# Patient Record
Sex: MALE | Race: WHITE | Employment: FULL TIME | URBAN - METROPOLITAN AREA
[De-identification: names, ages, dates, MRNs, and addresses within clinical notes are randomized per-mention and may not be internally consistent; named-entity substitution may affect disease eponyms.]

---

## 2017-01-17 RX ORDER — METOPROLOL SUCCINATE 100 MG/1
TABLET, EXTENDED RELEASE ORAL
Qty: 90 TAB | Refills: 3 | Status: SHIPPED | OUTPATIENT
Start: 2017-01-17 | End: 2018-01-29 | Stop reason: SDUPTHER

## 2017-03-31 RX ORDER — AMLODIPINE BESYLATE 5 MG/1
TABLET ORAL
Qty: 90 TAB | Refills: 3 | Status: SHIPPED | OUTPATIENT
Start: 2017-03-31 | End: 2017-05-02 | Stop reason: SDUPTHER

## 2017-05-02 ENCOUNTER — OFFICE VISIT (OUTPATIENT)
Dept: FAMILY MEDICINE CLINIC | Age: 57
End: 2017-05-02

## 2017-05-02 VITALS
WEIGHT: 239 LBS | DIASTOLIC BLOOD PRESSURE: 75 MMHG | HEIGHT: 70 IN | SYSTOLIC BLOOD PRESSURE: 142 MMHG | RESPIRATION RATE: 17 BRPM | HEART RATE: 71 BPM | BODY MASS INDEX: 34.22 KG/M2 | TEMPERATURE: 97.6 F

## 2017-05-02 DIAGNOSIS — I10 ESSENTIAL HYPERTENSION: ICD-10-CM

## 2017-05-02 DIAGNOSIS — R73.03 PREDIABETES: ICD-10-CM

## 2017-05-02 DIAGNOSIS — C61 PROSTATE CANCER (HCC): ICD-10-CM

## 2017-05-02 DIAGNOSIS — Z11.59 NEED FOR HEPATITIS C SCREENING TEST: ICD-10-CM

## 2017-05-02 DIAGNOSIS — K21.9 GASTROESOPHAGEAL REFLUX DISEASE WITHOUT ESOPHAGITIS: ICD-10-CM

## 2017-05-02 DIAGNOSIS — Z51.81 MEDICATION MONITORING ENCOUNTER: ICD-10-CM

## 2017-05-02 DIAGNOSIS — E66.9 OBESITY (BMI 30-39.9): ICD-10-CM

## 2017-05-02 DIAGNOSIS — E55.9 VITAMIN D DEFICIENCY: ICD-10-CM

## 2017-05-02 DIAGNOSIS — Z72.0 TOBACCO ABUSE: ICD-10-CM

## 2017-05-02 DIAGNOSIS — E78.2 MIXED HYPERLIPIDEMIA: ICD-10-CM

## 2017-05-02 DIAGNOSIS — I10 ESSENTIAL HYPERTENSION: Primary | ICD-10-CM

## 2017-05-02 DIAGNOSIS — G47.33 OBSTRUCTIVE SLEEP APNEA SYNDROME: ICD-10-CM

## 2017-05-02 RX ORDER — LISINOPRIL AND HYDROCHLOROTHIAZIDE 12.5; 2 MG/1; MG/1
2 TABLET ORAL DAILY
Qty: 180 TAB | Refills: 3 | Status: SHIPPED | OUTPATIENT
Start: 2017-05-02 | End: 2018-04-25 | Stop reason: SDUPTHER

## 2017-05-02 RX ORDER — AMLODIPINE BESYLATE 10 MG/1
TABLET ORAL
Qty: 90 TAB | Refills: 3 | Status: SHIPPED | OUTPATIENT
Start: 2017-05-02 | End: 2017-05-02 | Stop reason: SDUPTHER

## 2017-05-02 RX ORDER — AMLODIPINE BESYLATE 10 MG/1
TABLET ORAL
Qty: 90 TAB | Refills: 3 | Status: SHIPPED | OUTPATIENT
Start: 2017-05-02 | End: 2017-10-04 | Stop reason: SDUPTHER

## 2017-05-02 NOTE — PATIENT INSTRUCTIONS

## 2017-05-02 NOTE — PROGRESS NOTES
1. Have you been to the ER, urgent care clinic since your last visit? Hospitalized since your last visit? No.     2. Have you seen or consulted any other health care providers outside of the [address] [place] [address] since your last visit? Include any pap smears or colon screening. Yes, saw Dr. Gian Clemente ([facility]) on 3/2017. Patient presents with follow up Hypertension and obesity. He increased his norvasc to 2 tablets 3 months ago.

## 2017-05-02 NOTE — MR AVS SNAPSHOT
Visit Information Date & Time Provider Department Dept. Phone Encounter #  
 5/2/2017  7:45 AM Sneha Contreras, [address] [place] [address] [phone] [id number] Follow-up Instructions Return in about 2 months (around 7/2/2017) for 30 minute slot and fasting labs prior. Upcoming Health Maintenance Date Due Hepatitis C Screening 1960 Pneumococcal 19-64 Medium Risk (1 of 1 - PPSV23) 10/23/1981 DTaP/Tdap/Td series (1 - Tdap) 10/23/1983 FOBT Q 1 YEAR AGE 50-75 10/23/2012 INFLUENZA AGE 9 TO ADULT 8/1/2017 Allergies as of 5/2/2017  Review Complete On: 5/2/2017 By: Sneha Contreras MD  
  
 Severity Noted Reaction Type Reactions Strattera [Atomoxetine]  06/20/2013    Other (comments) Prostatitus Sulfa (Sulfonamide Antibiotics)  06/20/2013    Hives, Itching Venom-wasp  07/29/2014    Swelling Current Immunizations  Reviewed on 6/21/2016 Name Date Influenza Vaccine 10/31/2014 Influenza Vaccine (Quad) PF 12/30/2015 Not reviewed this visit You Were Diagnosed With   
  
 Codes Comments Essential hypertension    -  Primary ICD-10-CM: I10 
ICD-9-CM: 401.9 Medication monitoring encounter     ICD-10-CM: Z51.81 
ICD-9-CM: V58.83 Obstructive sleep apnea syndrome     ICD-10-CM: G47.33 
ICD-9-CM: 327.23 Prediabetes     ICD-10-CM: R73.03 
ICD-9-CM: 790.29 Obesity (BMI 30-39. 9)     ICD-10-CM: E66.9 ICD-9-CM: 278.00 Vitamin D deficiency     ICD-10-CM: E55.9 ICD-9-CM: 268.9 Gastroesophageal reflux disease without esophagitis     ICD-10-CM: K21.9 ICD-9-CM: 530.81 Tobacco abuse     ICD-10-CM: Z72.0 ICD-9-CM: 305.1 Prostate cancer [facility])     ICD-10-CM: T61 ICD-9-CM: 030 Mixed hyperlipidemia     ICD-10-CM: E78.2 ICD-9-CM: 272.2 Vitals BP Pulse Temp Resp Height(growth percentile) Weight(growth percentile) 142/75 71 97.6 °F (36.4 °C) (Oral) 17 5' 10\" (1.778 m) 239 lb (108.4 kg) BMI Smoking Status 34.29 kg/m2 Current Every Day Smoker Vitals History BMI and BSA Data Body Mass Index Body Surface Area  
 34.29 kg/m 2 2.31 m 2 Preferred Pharmacy Pharmacy Name Phone [facility] [facility], [address] [place] [address] [address] [facility] [phone] Your Updated Medication List  
  
   
This list is accurate as of: 5/2/17  8:36 AM.  Always use your most recent med list. amLODIPine 10 mg tablet Commonly known as:  NORVASC  
take 2 tablets by mouth once daily Glucosamine &Chondroit-MV-Min3 081-848-23-0.5 mg Tab Take 1 Tab by mouth daily. lisinopril-hydroCHLOROthiazide 20-12.5 mg per tablet Commonly known as:  Kristen Blair Take 2 Tabs by mouth daily. metoprolol succinate 100 mg tablet Commonly known as:  TOPROL-XL  
take 1 tablet by mouth once daily  
  
 multivitamin tablet Commonly known as:  ONE A DAY Take 1 Tab by mouth daily. omeprazole 20 mg capsule Commonly known as:  PRILOSEC  
take 1 capsule by mouth once daily  
  
 plant stanol jerrell 450 mg Tab Commonly known as:  CHOLEST OFF Take 1 Tab by mouth daily. rosuvastatin 40 mg tablet Commonly known as:  CRESTOR  
TAKE 1 TABLET BY MOUTH NIGHTLY  
  
 VITAMIN D3 1,000 unit Cap Generic drug:  cholecalciferol Take  by mouth. Prescriptions Sent to Pharmacy Refills  
 lisinopril-hydroCHLOROthiazide (PRINZIDE, ZESTORETIC) 20-12.5 mg per tablet 3 Sig: Take 2 Tabs by mouth daily. Class: Normal  
 Pharmacy: [address] [place] [address] Ph #: [phone] Route: Oral  
 amLODIPine (NORVASC) 10 mg tablet 3 Sig: take 2 tablets by mouth once daily Class: Normal  
 Pharmacy: [address] [place] [address] Ph #: [phone] Follow-up Instructions Return in about 2 months (around 7/2/2017) for 30 minute slot and fasting labs prior. To-Do List   
 05/02/2017 Lab:  CBC W/O DIFF   
  
 05/02/2017 Lab:  HEMOGLOBIN A1C WITH EAG   
  
 05/02/2017 Lab:  LIPID PANEL   
  
 05/02/2017 Lab:  METABOLIC PANEL, COMPREHENSIVE   
  
 05/02/2017 Lab:  PSA SCREENING (SCREENING) 05/02/2017 Lab:  VITAMIN B12   
  
 05/02/2017 Lab:  VITAMIN D, 25 HYDROXY Patient Instructions DASH Diet: Care Instructions Your Care Instructions The DASH diet is an eating plan that can help lower your blood pressure. DASH stands for Dietary Approaches to Stop Hypertension. Hypertension is high blood pressure. The DASH diet focuses on eating foods that are high in calcium, potassium, and magnesium. These nutrients can lower blood pressure. The foods that are highest in these nutrients are fruits, vegetables, low-fat dairy products, nuts, seeds, and legumes. But taking calcium, potassium, and magnesium supplements instead of eating foods that are high in those nutrients does not have the same effect. The DASH diet also includes whole grains, fish, and poultry. The DASH diet is one of several lifestyle changes your doctor may recommend to lower your high blood pressure. Your doctor may also want you to decrease the amount of sodium in your diet. Lowering sodium while following the DASH diet can lower blood pressure even further than just the DASH diet alone. Follow-up care is a key part of your treatment and safety. Be sure to make and go to all appointments, and call your doctor if you are having problems. It's also a good idea to know your test results and keep a list of the medicines you take. How can you care for yourself at home? Following the DASH diet · Eat 4 to 5 servings of fruit each day.  A serving is 1 medium-sized piece of fruit, ½ cup chopped or canned fruit, 1/4 cup dried fruit, or 4 ounces (½ cup) of fruit juice. Choose fruit more often than fruit juice. · Eat 4 to 5 servings of vegetables each day. A serving is 1 cup of lettuce or raw leafy vegetables, ½ cup of chopped or cooked vegetables, or 4 ounces (½ cup) of vegetable juice. Choose vegetables more often than vegetable juice. · Get 2 to 3 servings of low-fat and fat-free dairy each day. A serving is 8 ounces of milk, 1 cup of yogurt, or 1 ½ ounces of cheese. · Eat 6 to 8 servings of grains each day. A serving is 1 slice of bread, 1 ounce of dry cereal, or ½ cup of cooked rice, pasta, or cooked cereal. Try to choose whole-grain products as much as possible. · Limit lean meat, poultry, and fish to 2 servings each day. A serving is 3 ounces, about the size of a deck of cards. · Eat 4 to 5 servings of nuts, seeds, and legumes (cooked dried beans, lentils, and split peas) each week. A serving is 1/3 cup of nuts, 2 tablespoons of seeds, or ½ cup of cooked beans or peas. · Limit fats and oils to 2 to 3 servings each day. A serving is 1 teaspoon of vegetable oil or 2 tablespoons of salad dressing. · Limit sweets and added sugars to 5 servings or less a week. A serving is 1 tablespoon jelly or jam, ½ cup sorbet, or 1 cup of lemonade. · Eat less than 2,300 milligrams (mg) of sodium a day. If you limit your sodium to 1,500 mg a day, you can lower your blood pressure even more. Tips for success · Start small. Do not try to make dramatic changes to your diet all at once. You might feel that you are missing out on your favorite foods and then be more likely to not follow the plan. Make small changes, and stick with them. Once those changes become habit, add a few more changes. · Try some of the following: ¨ Make it a goal to eat a fruit or vegetable at every meal and at snacks. This will make it easy to get the recommended amount of fruits and vegetables each day. ¨ Try yogurt topped with fruit and nuts for a snack or healthy dessert. ¨ Add lettuce, tomato, cucumber, and onion to sandwiches. ¨ Combine a ready-made pizza crust with low-fat mozzarella cheese and lots of vegetable toppings. Try using tomatoes, squash, spinach, broccoli, carrots, cauliflower, and onions. ¨ Have a variety of cut-up vegetables with a low-fat dip as an appetizer instead of chips and dip. ¨ Sprinkle sunflower seeds or chopped almonds over salads. Or try adding chopped walnuts or almonds to cooked vegetables. ¨ Try some vegetarian meals using beans and peas. Add garbanzo or kidney beans to salads. Make burritos and tacos with mashed hudson beans or black beans. Where can you learn more? Go to http://cristo-german.info/. Enter G697 in the search box to learn more about \"DASH Diet: Care Instructions. \" Current as of: March 23, 2016 Content Version: 11.2 © 5950-4895 [employer]. Care instructions adapted under license by [employer] (which disclaims liability or warranty for this information). If you have questions about a medical condition or this instruction, always ask your healthcare professional. [place] [address] any warranty or liability for your use of this information. Introducing [facility]! Dear Valeria Clancy: Thank you for requesting a [employer] account. Our records indicate that you already have an active GuideWall account. You can access your account anytime at [email or web address] Did you know that you can access your hospital and ER discharge instructions at any time in GuideWall? You can also review all of your test results from your hospital stay or ER visit. Additional Information If you have questions, please visit the Frequently Asked Questions section of the GuideWall website at [email or web address] Remember, GuideWall is NOT to be used for urgent needs. For medical emergencies, dial 911. Now available from your iPhone and Android! Please provide this summary of care documentation to your next provider. Your primary care clinician is listed as AKSHAT Cabrera. If you have any questions after today's visit, please call [phone].

## 2017-05-02 NOTE — PROGRESS NOTES
Bertram Barrett is a 49 y.o. male and presents with Follow Up Chronic Condition and Hypertension       Subjective:    HTN- taking all meds- watching sodium intake - doubled dose of norvasc  Prediabetes- no polyuria or polydipsia  Tobacco-still smoking. Didn't like wellbutrin. Never tried chantix  Obesity- lost some weight but still has BMI over 30 - reviewed with pt. Stressed about father with metastatic lung cancer. H/o EtOH abuse- still abstinent- approaching 7 years now.      Assessment/Plan:      HTN- mildly elevated- but losing weight so will hold off on adding another med for now. Prediabetes- recheck a1c. Tobacco-strongly encouraged complete cessation. Consider chantix. Obesity- strongly encouraged wt loss- calorie counting/regular weights. H/o EtOH abuse- encouraged continued cessation. RTC in 2 months with labs. Orders Placed This Encounter    METABOLIC PANEL, COMPREHENSIVE     Standing Status:   Future     Standing Expiration Date:   5/3/2018    LIPID PANEL     Standing Status:   Future     Standing Expiration Date:   5/3/2018    PSA SCREENING (SCREENING)     Standing Status:   Future     Standing Expiration Date:   5/3/2018    HEMOGLOBIN A1C WITH EAG     Standing Status:   Future     Standing Expiration Date:   5/3/2018    CBC W/O DIFF     Standing Status:   Future     Standing Expiration Date:   5/3/2018    VITAMIN B12     Standing Status:   Future     Standing Expiration Date:   5/3/2018    VITAMIN D, 25 HYDROXY     Standing Status:   Future     Standing Expiration Date:   5/2/2018    lisinopril-hydroCHLOROthiazide (PRINZIDE, ZESTORETIC) 20-12.5 mg per tablet     Sig: Take 2 Tabs by mouth daily. Dispense:  180 Tab     Refill:  3    amLODIPine (NORVASC) 10 mg tablet     Sig: take 2 tablets by mouth once daily     Dispense:  90 Tab     Refill:  3     Epifanio Zuniga was seen today for follow up chronic condition and hypertension.     Diagnoses and all orders for this visit:    Essential hypertension  -     CBC W/O DIFF; Future    Medication monitoring encounter  -     VITAMIN B12; Future    Obstructive sleep apnea syndrome    Prediabetes    Obesity (BMI 30-39.9)  -     HEMOGLOBIN A1C WITH EAG; Future    Vitamin D deficiency  -     VITAMIN D, 25 HYDROXY; Future    Gastroesophageal reflux disease without esophagitis  -     VITAMIN B12; Future    Tobacco abuse    Prostate cancer (HCC)  -     PSA SCREENING (SCREENING); Future    Mixed hyperlipidemia  -     METABOLIC PANEL, COMPREHENSIVE; Future  -     LIPID PANEL; Future    Other orders  -     lisinopril-hydroCHLOROthiazide (PRINZIDE, ZESTORETIC) 20-12.5 mg per tablet; Take 2 Tabs by mouth daily. -     amLODIPine (NORVASC) 10 mg tablet; take 2 tablets by mouth once daily  -     plant stanol jerrell (CHOLEST OFF) 450 mg tab; Take 1 Tab by mouth daily.  -     Glucosamine &Chondroit-MV-Min3 867-497-26-0.5 mg tab; Take 1 Tab by mouth daily. ROS:  Negative except as mentioned above  Cardiac- no chest pain or palpitations  Pulmonary- no sob or wheezes  GI- no n/v or diarrhea.     SH:  Social History   Substance Use Topics    Smoking status: Current Every Day Smoker     Packs/day: 1.00     Years: 30.00     Types: Cigarettes    Smokeless tobacco: Never Used    Alcohol use No      Comment: recoverin alcoholic         Medications/Allergies:  Current Outpatient Prescriptions on File Prior to Visit   Medication Sig Dispense Refill    amLODIPine (NORVASC) 5 mg tablet take 1 tablet by mouth once daily (Patient taking differently: take 2 tablets by mouth once daily) 90 Tab 3    metoprolol succinate (TOPROL-XL) 100 mg tablet take 1 tablet by mouth once daily 90 Tab 3    omeprazole (PRILOSEC) 20 mg capsule take 1 capsule by mouth once daily 90 Cap 3    lisinopril-hydroCHLOROthiazide (PRINZIDE, ZESTORETIC) 20-25 mg per tablet take 1 tablet by mouth once daily 90 Tab 3    rosuvastatin (CRESTOR) 40 mg tablet TAKE 1 TABLET BY MOUTH NIGHTLY 90 Tab 3    methocarbamol (ROBAXIN) 500 mg tablet Take 1 Tab by mouth four (4) times daily. Take for up to 10 days. 40 Tab 2    Omeprazole delayed release (PRILOSEC D/R) 20 mg tablet Take 1 tablet by mouth daily. 90 tablet 3    albuterol (PROVENTIL VENTOLIN) 2.5 mg /3 mL (0.083 %) nebulizer solution by Nebulization route once.  PLANT STANOL BURAK (CHOLEST OFF PO) Take  by mouth.  Glucosamine &Chondroit-MV-Min3 114-801-67-0.5 mg tab Take  by mouth.  Cholecalciferol, Vitamin D3, (VITAMIN D3) 1,000 unit cap Take  by mouth.  multivitamin (ONE A DAY) tablet Take 1 Tab by mouth daily. No current facility-administered medications on file prior to visit. Allergies   Allergen Reactions    Strattera [Atomoxetine] Other (comments)     Prostatitus    Sulfa (Sulfonamide Antibiotics) Hives and Itching    Venom-Wasp Swelling       Objective:  Visit Vitals    /75    Pulse 71    Temp 97.6 °F (36.4 °C) (Oral)    Resp 17    Ht 5' 10\" (1.778 m)    Wt 239 lb (108.4 kg)    BMI 34.29 kg/m2    Body mass index is 34.29 kg/(m^2). Constitutional: Well developed, nourished, no distress, alert   HENT: Exterior ears and tympanic membranes normal bilaterally. Supple neck. No thyromegaly or lymphadenopathy. Oropharynx clear and moist mucous membranes. Eyes: Conjunctiva normal. PERRL. CV: S1, S2.  RRR. No murmurs/rubs. No thrills palpated. No carotid bruits. Intact distal pulses. No edema. Pulm: No abnormalities on inspection. Clear to auscultation bilaterally. No wheezing/rhonchi. Normal effort. GI: Soft, nontender, nondistended. Normal active bowel sounds. No  masses on palpation. No hepatosplenomegaly.

## 2017-05-22 RX ORDER — ROSUVASTATIN CALCIUM 40 MG/1
TABLET, COATED ORAL
Qty: 90 TAB | Refills: 3 | Status: SHIPPED | OUTPATIENT
Start: 2017-05-22 | End: 2018-06-07 | Stop reason: SDUPTHER

## 2017-07-03 ENCOUNTER — OFFICE VISIT (OUTPATIENT)
Dept: FAMILY MEDICINE CLINIC | Age: 57
End: 2017-07-03

## 2017-07-03 VITALS
HEART RATE: 66 BPM | RESPIRATION RATE: 17 BRPM | WEIGHT: 237.4 LBS | TEMPERATURE: 97.8 F | HEIGHT: 70 IN | DIASTOLIC BLOOD PRESSURE: 66 MMHG | BODY MASS INDEX: 33.99 KG/M2 | SYSTOLIC BLOOD PRESSURE: 123 MMHG

## 2017-07-03 DIAGNOSIS — E66.09 NON MORBID OBESITY DUE TO EXCESS CALORIES: ICD-10-CM

## 2017-07-03 DIAGNOSIS — F17.210 CIGARETTE NICOTINE DEPENDENCE WITHOUT COMPLICATION: Primary | ICD-10-CM

## 2017-07-03 RX ORDER — VARENICLINE TARTRATE 1 MG/1
TABLET, FILM COATED ORAL
Qty: 60 TAB | Refills: 4 | Status: SHIPPED | OUTPATIENT
Start: 2017-07-03 | End: 2017-10-01

## 2017-07-03 RX ORDER — VARENICLINE TARTRATE 25 MG
KIT ORAL
Qty: 1 DOSE PACK | Refills: 0 | Status: SHIPPED | OUTPATIENT
Start: 2017-07-03 | End: 2017-08-04 | Stop reason: ALTCHOICE

## 2017-07-03 NOTE — PROGRESS NOTES
1. Have you been to the ER, urgent care clinic since your last visit? Hospitalized since your last visit? No.     2. Have you seen or consulted any other health care providers outside of the [address] [place] [address] since your last visit? Include any pap smears or colon screening. No.     Patient presents with follow up Hypertension, Pre diabetes, Obesity and tobacco abuse.

## 2017-07-03 NOTE — MR AVS SNAPSHOT
Visit Information Date & Time Provider Department Dept. Phone Encounter #  
 7/3/2017  9:15 AM Jessa Manning, [address] [facility] [phone] [id number] Follow-up Instructions Return in about 4 weeks (around 7/31/2017) for with fasting labs prior. Jos Lehman Upcoming Health Maintenance Date Due Hepatitis C Screening 1960 Pneumococcal 19-64 Highest Risk (1 of 3 - PCV13) 10/23/1981 DTaP/Tdap/Td series (1 - Tdap) 10/23/1983 FOBT Q 1 YEAR AGE 50-75 10/23/2012 INFLUENZA AGE 9 TO ADULT 8/1/2017 Allergies as of 7/3/2017  Review Complete On: 7/3/2017 By: Jessa Manning MD  
  
 Severity Noted Reaction Type Reactions Strattera [Atomoxetine]  06/20/2013    Other (comments) Prostatitus Sulfa (Sulfonamide Antibiotics)  06/20/2013    Hives, Itching Venom-wasp  07/29/2014    Swelling Current Immunizations  Reviewed on 6/21/2016 Name Date Influenza Vaccine 10/31/2014 Influenza Vaccine (Quad) PF 12/30/2015 Not reviewed this visit You Were Diagnosed With   
  
 Codes Comments Cigarette nicotine dependence without complication    -  Primary ICD-10-CM: F09.216 ICD-9-CM: 305.1 Non morbid obesity due to excess calories     ICD-10-CM: E66.09 
ICD-9-CM: 278.00 Vitals BP Pulse Temp Resp Height(growth percentile) Weight(growth percentile) 123/66 66 97.8 °F (36.6 °C) (Oral) 17 5' 10\" (1.778 m) 237 lb 6.4 oz (107.7 kg) BMI Smoking Status 34.06 kg/m2 Current Every Day Smoker Vitals History BMI and BSA Data Body Mass Index Body Surface Area 34.06 kg/m 2 2.31 m 2 Preferred Pharmacy Pharmacy Name Phone [facility], [address] [place] [address] [address] [facility] [phone] Your Updated Medication List  
  
   
This list is accurate as of: 7/3/17  9:34 AM.  Always use your most recent med list. amLODIPine 10 mg tablet Commonly known as:  NORVASC  
take 1 tablets by mouth once daily Glucosamine &Chondroit-MV-Min3 370-383-50-0.5 mg Tab Take 1 Tab by mouth daily. lisinopril-hydroCHLOROthiazide 20-12.5 mg per tablet Commonly known as:  Mayda Buttner Take 2 Tabs by mouth daily. metoprolol succinate 100 mg tablet Commonly known as:  TOPROL-XL  
take 1 tablet by mouth once daily  
  
 multivitamin tablet Commonly known as:  ONE A DAY Take 1 Tab by mouth daily. OMEGA 3-6-9 40-60-10 mg-mg-unit Cap Generic drug:  OM 3-E-Linol-ALA-Oleic-GLA-Lip Take  by mouth daily. omeprazole 20 mg capsule Commonly known as:  PRILOSEC  
take 1 capsule by mouth once daily  
  
 plant stanol jerrell 450 mg Tab Commonly known as:  CHOLEST OFF Take 1 Tab by mouth daily. PROBIOTIC 4X 10-15 mg Tbec Generic drug:  B.infantis-B.ani-B.long-B.bifi Take  by mouth daily. rosuvastatin 40 mg tablet Commonly known as:  CRESTOR  
take 1 tablet by mouth at bedtime * varenicline 0.5 mg (11)- 1 mg (42) Dspk Commonly known as:  CHANTIX STARTER TAHIR Follow starter pack directions * varenicline 1 mg tablet Commonly known as:  Rachell Sleeper One tab PO BID  
  
 VITAMIN D3 1,000 unit Cap Generic drug:  cholecalciferol Take  by mouth. * Notice: This list has 2 medication(s) that are the same as other medications prescribed for you. Read the directions carefully, and ask your doctor or other care provider to review them with you. Prescriptions Printed Refills  
 varenicline (CHANTIX) 1 mg tablet 4 Sig: One tab PO BID Class: Print Prescriptions Sent to Pharmacy Refills  
 varenicline (CHANTIX STARTER TAHIR) 0.5 mg (11)- 1 mg (42) DsPk 0 Sig: Follow starter pack directions Class: Normal  
 Pharmacy: [id number], [address] [place] [address] #: [phone] Follow-up Instructions Return in about 4 weeks (around 7/31/2017) for with fasting labs prior. Kevin Hurtado Patient Instructions Varenicline (By mouth) Varenicline ([place] Fails) Helps you quit smoking, as part of a support program.  
Brand Name(s): Chantix, Chantix Starting Month Pak There may be other brand names for this medicine. When This Medicine Should Not Be Used: This medicine is not right for everyone. Do not use it if you had an allergic reaction to varenicline. How to Use This Medicine:  
Tablet · Take your medicine as directed. Your dose or schedule may need to be changed to find what works best for you. · Tell your doctor what date you have set to stop smoking. This medicine needs to be started 1 week before that date. · It is best to take this medicine with a full glass of water after you eat. · This medicine should come with a Medication Guide. Ask your pharmacist for a copy if you do not have one. · Missed dose: Take a dose as soon as you remember. If it is almost time for your next dose, wait until then and take a regular dose. Do not take extra medicine to make up for a missed dose. · Store the medicine in a closed container at room temperature, away from heat, moisture, and direct light. Drugs and Foods to Avoid: Ask your doctor or pharmacist before using any other medicine, including over-the-counter medicines, vitamins, and herbal products. · Some medicines can affect how varenicline works. Tell your doctor if you are using any of the following: 
¨ Insulin ¨ Theophylline ¨ Blood thinner (including warfarin) · This medicine can affect your ability to tolerate alcohol. Limit the amount of alcohol that you drink until you know how this medicine affects you. Warnings While Using This Medicine: · Tell your doctor if you are pregnant or breastfeeding, or if you have kidney disease, heart or blood vessel problems, angina, or a history of heart attack, stroke, depression or mental health problems, or seizures. · For some people, this medicine may increase mental or emotional problems. This may lead to thoughts of suicide and violence. Talk with your doctor right away if you have any thought or behavior changes that concern you. Tell your doctor if you or anyone in your family has a history of bipolar disorder or suicide attempts. · This medicine may cause the following problems: 
¨ Increased risk of heart attack or stroke ¨ Serious skin reactions ¨ Sleepwalking · This medicine may cause you to become dizzy or drowsy, or have trouble concentrating. Do not drive or do anything else that could be dangerous until you know this medicine affects you. · Your doctor will check your progress and the effects of this medicine at regular visits. Keep all appointments. · Keep all medicine out of the reach of children. Never share your medicine with anyone. Possible Side Effects While Using This Medicine:  
Call your doctor right away if you notice any of these side effects: · Allergic reaction: Itching or hives, swelling in your face or hands, swelling or tingling in your mouth or throat, chest tightness, trouble breathing · Blistering, peeling, red skin rash · Chest pain, fast, pounding, or uneven heartbeat · Feeling anxious, depressed, restless, or irritable · Numbness or weakness on one side of your body, sudden or severe headache, problems with vision, speech, or walking · Seeing or hearing things that are not really there · Seizures · Thoughts of hurting yourself or others, unusual moods or behaviors If you notice these less serious side effects, talk with your doctor:  
· Headache · Nausea, gas · Trouble sleeping, unusual dreams, sleepwalking If you notice other side effects that you think are caused by this medicine, tell your doctor. Call your doctor for medical advice about side effects.  You may report side effects to FDA at 3-261-FDA-3579 © 2017 [facility] Information is for End User's use only and may not be sold, redistributed or otherwise used for commercial purposes. The above information is an  only. It is not intended as medical advice for individual conditions or treatments. Talk to your doctor, nurse or pharmacist before following any medical regimen to see if it is safe and effective for you. Introducing [facility]! Dear Virgen Adams: Thank you for requesting a Crossbar account. Our records indicate that you already have an active Crossbar account. You can access your account anytime at [email or web address] Did you know that you can access your hospital and ER discharge instructions at any time in Crossbar? You can also review all of your test results from your hospital stay or ER visit. Additional Information If you have questions, please visit the Frequently Asked Questions section of the Crossbar website at [email or web address] Remember, Crossbar is NOT to be used for urgent needs. For medical emergencies, dial 911. Now available from your iPhone and Android! Please provide this summary of care documentation to your next provider. Your primary care clinician is listed as AKSHAT Cabrera. If you have any questions after today's visit, please call [phone].

## 2017-07-03 NOTE — PATIENT INSTRUCTIONS
Varenicline (By mouth)   Varenicline ([id number]-kleen)  Helps you quit smoking, as part of a support program.   Brand Name(s): Chantix, Chantix Starting Month Pak   There may be other brand names for this medicine. When This Medicine Should Not Be Used: This medicine is not right for everyone. Do not use it if you had an allergic reaction to varenicline. How to Use This Medicine:   Tablet  · Take your medicine as directed. Your dose or schedule may need to be changed to find what works best for you. · Tell your doctor what date you have set to stop smoking. This medicine needs to be started 1 week before that date. · It is best to take this medicine with a full glass of water after you eat. · This medicine should come with a Medication Guide. Ask your pharmacist for a copy if you do not have one. · Missed dose: Take a dose as soon as you remember. If it is almost time for your next dose, wait until then and take a regular dose. Do not take extra medicine to make up for a missed dose. · Store the medicine in a closed container at room temperature, away from heat, moisture, and direct light. Drugs and Foods to Avoid:   Ask your doctor or pharmacist before using any other medicine, including over-the-counter medicines, vitamins, and herbal products. · Some medicines can affect how varenicline works. Tell your doctor if you are using any of the following:  ¨ Insulin  ¨ Theophylline  ¨ Blood thinner (including warfarin)  · This medicine can affect your ability to tolerate alcohol. Limit the amount of alcohol that you drink until you know how this medicine affects you. Warnings While Using This Medicine:   · Tell your doctor if you are pregnant or breastfeeding, or if you have kidney disease, heart or blood vessel problems, angina, or a history of heart attack, stroke, depression or mental health problems, or seizures. · For some people, this medicine may increase mental or emotional problems.  This may lead to thoughts of suicide and violence. Talk with your doctor right away if you have any thought or behavior changes that concern you. Tell your doctor if you or anyone in your family has a history of bipolar disorder or suicide attempts. · This medicine may cause the following problems:  ¨ Increased risk of heart attack or stroke  ¨ Serious skin reactions  ¨ Sleepwalking  · This medicine may cause you to become dizzy or drowsy, or have trouble concentrating. Do not drive or do anything else that could be dangerous until you know this medicine affects you. · Your doctor will check your progress and the effects of this medicine at regular visits. Keep all appointments. · Keep all medicine out of the reach of children. Never share your medicine with anyone. Possible Side Effects While Using This Medicine:   Call your doctor right away if you notice any of these side effects:  · Allergic reaction: Itching or hives, swelling in your face or hands, swelling or tingling in your mouth or throat, chest tightness, trouble breathing  · Blistering, peeling, red skin rash  · Chest pain, fast, pounding, or uneven heartbeat  · Feeling anxious, depressed, restless, or irritable  · Numbness or weakness on one side of your body, sudden or severe headache, problems with vision, speech, or walking  · Seeing or hearing things that are not really there  · Seizures  · Thoughts of hurting yourself or others, unusual moods or behaviors  If you notice these less serious side effects, talk with your doctor:   · Headache  · Nausea, gas  · Trouble sleeping, unusual dreams, sleepwalking  If you notice other side effects that you think are caused by this medicine, tell your doctor. Call your doctor for medical advice about side effects. You may report side effects to FDA at 1-557-FDA-0762  © 2017 [address] Information is for End User's use only and may not be sold, redistributed or otherwise used for commercial purposes.   The above information is an  only. It is not intended as medical advice for individual conditions or treatments. Talk to your doctor, nurse or pharmacist before following any medical regimen to see if it is safe and effective for you.

## 2017-07-03 NOTE — PROGRESS NOTES
Lakhwinder Willett is a 49 y.o. male and presents with Follow Up Chronic Condition; Hypertension; Nicotine Dependence; Obesity (Pre diabetes); and Other       Subjective:  Cigarette smoking- ready to quit- interested in chantix. Assessment/Plan:    Nola Pascual was seen today for follow up chronic condition, hypertension, nicotine dependence, obesity and other. Diagnoses and all orders for this visit:    Cigarette nicotine dependence without complication- starting chantix and discussed risked benefits possible side effects and all questions answered. Non morbid obesity due to excess calories- encouraged continued wt loss and exercise. Goal of 1 lb per week loss. Other orders  -     varenicline (CHANTIX STARTER TAHIR) 0.5 mg (11)- 1 mg (42) DsPk; Follow starter pack directions  -     varenicline (CHANTIX) 1 mg tablet; One tab PO BID          RTC in 1 month  Orders Placed This Encounter    OM 3-E-Linol-ALA-Oleic-GLA-Lip (OMEGA 3-6-9) 40-60-10 mg-mg-unit cap     Sig: Take  by mouth daily.  B.infantis-B.ani-B.long-B.bifi (PROBIOTIC 4X) 10-15 mg TbEC     Sig: Take  by mouth daily.  varenicline (CHANTIX STARTER TAHIR) 0.5 mg (11)- 1 mg (42) DsPk     Sig: Follow starter pack directions     Dispense:  1 Dose Pack     Refill:  0    varenicline (CHANTIX) 1 mg tablet     Sig: One tab PO BID     Dispense:  60 Tab     Refill:  4   Nola Pascual was seen today for follow up chronic condition, hypertension, nicotine dependence, obesity and other. Diagnoses and all orders for this visit:    Cigarette nicotine dependence without complication    Non morbid obesity due to excess calories    Other orders  -     varenicline (CHANTIX STARTER TAHIR) 0.5 mg (11)- 1 mg (42) DsPk; Follow starter pack directions  -     varenicline (CHANTIX) 1 mg tablet; One tab PO BID              ROS:  Negative except as mentioned above  Cardiac- no chest pain or palpitations  Pulmonary- no sob or wheezes  GI- no n/v or diarrhea.       SH:  Social History Substance Use Topics    Smoking status: Current Every Day Smoker     Packs/day: 1.00     Years: 30.00     Types: Cigarettes    Smokeless tobacco: Never Used    Alcohol use No      Comment: recoverin alcoholic         Medications/Allergies:  Current Outpatient Prescriptions on File Prior to Visit   Medication Sig Dispense Refill    rosuvastatin (CRESTOR) 40 mg tablet take 1 tablet by mouth at bedtime 90 Tab 3    lisinopril-hydroCHLOROthiazide (PRINZIDE, ZESTORETIC) 20-12.5 mg per tablet Take 2 Tabs by mouth daily. 180 Tab 3    plant stanol jerrell (CHOLEST OFF) 450 mg tab Take 1 Tab by mouth daily. 1 Tab 0    Glucosamine &Chondroit-MV-Min3 523-178-09-0.5 mg tab Take 1 Tab by mouth daily. 1 Tab 0    amLODIPine (NORVASC) 10 mg tablet take 1 tablets by mouth once daily 90 Tab 3    metoprolol succinate (TOPROL-XL) 100 mg tablet take 1 tablet by mouth once daily 90 Tab 3    omeprazole (PRILOSEC) 20 mg capsule take 1 capsule by mouth once daily 90 Cap 3    Cholecalciferol, Vitamin D3, (VITAMIN D3) 1,000 unit cap Take  by mouth.  multivitamin (ONE A DAY) tablet Take 1 Tab by mouth daily. No current facility-administered medications on file prior to visit. Allergies   Allergen Reactions    Strattera [Atomoxetine] Other (comments)     Prostatitus    Sulfa (Sulfonamide Antibiotics) Hives and Itching    Venom-Wasp Swelling       Objective:  Visit Vitals    /66    Pulse 66    Temp 97.8 °F (36.6 °C) (Oral)    Resp 17    Ht 5' 10\" (1.778 m)    Wt 237 lb 6.4 oz (107.7 kg)    BMI 34.06 kg/m2    Body mass index is 34.06 kg/(m^2). Constitutional: Well developed, nourished, no distress, alert   CV: S1, S2.  RRR. No murmurs/rubs. No thrills palpated. No carotid bruits. Intact distal pulses. No edema. Pulm: No abnormalities on inspection. Clear to auscultation bilaterally. No wheezing/rhonchi. Normal effort. GI: Soft, nontender, nondistended. Normal active bowel sounds.  No masses on palpation. No hepatosplenomegaly.

## 2017-07-28 ENCOUNTER — TELEPHONE (OUTPATIENT)
Dept: FAMILY MEDICINE CLINIC | Age: 57
End: 2017-07-28

## 2017-07-28 NOTE — TELEPHONE ENCOUNTER
Up at  when patient called. Tammy Whitt PSR answered phone. Could hear patient yelling through phone at Ms. Annalisa Rodriguez about his appointment being rescheduled. PSR Arthur tried several times to explain why appointment was rescheduled However patient would not let her speak. Per Jaylyn Lomeli patient was called on 7-27-17 to inform patient his appointment needed to be rescheduled. Per PSAKSHAT Gibson patient did not answer phone so a message was left informing him of rescheduled appointment  new date and time and if this was not a good date or time to call back and the office would schedule an appointment to meet his needs. Patient continued yelling and not letting  NAHUN Gibson get a full sentence out. I asked her to place patient on hold. At that time I resumed call. Identified self and patient started yelling again. I attempted to explain why appointment was rescheduled. Patient demanded to speak to . Informed patient that she was out of office until Monday 7-31-17 and I would leave her a message to return his call. Patient still not satisfied and still yelling. Apologized to patient. Asked if there was any other way I could help him. Patient stated, \"you haven't helped me with anything\". Wished patient a good day and ended call.

## 2017-07-28 NOTE — TELEPHONE ENCOUNTER
Pt. Called and became very angry when appt. was changed due to physician having meeting. Pt. Checked message late last night but message was left yesterday at 11:24am. [facility] the head nurse talked to him and tried to explain to him that the message was left so that he would not make an unnecessary trip to the office.

## 2017-08-04 ENCOUNTER — OFFICE VISIT (OUTPATIENT)
Dept: FAMILY MEDICINE CLINIC | Age: 57
End: 2017-08-04

## 2017-08-04 VITALS
SYSTOLIC BLOOD PRESSURE: 144 MMHG | TEMPERATURE: 96.9 F | HEART RATE: 67 BPM | DIASTOLIC BLOOD PRESSURE: 68 MMHG | RESPIRATION RATE: 16 BRPM | HEIGHT: 70 IN | WEIGHT: 243.4 LBS | BODY MASS INDEX: 34.84 KG/M2

## 2017-08-04 DIAGNOSIS — E87.0 HYPERNATREMIA: ICD-10-CM

## 2017-08-04 DIAGNOSIS — Z72.0 TOBACCO ABUSE: ICD-10-CM

## 2017-08-04 DIAGNOSIS — E87.0 HYPERNATREMIA: Primary | ICD-10-CM

## 2017-08-04 RX ORDER — ALBUTEROL SULFATE 90 UG/1
1 AEROSOL, METERED RESPIRATORY (INHALATION)
Qty: 1 INHALER | Refills: 1 | Status: SHIPPED | OUTPATIENT
Start: 2017-08-04 | End: 2020-08-17 | Stop reason: SDUPTHER

## 2017-08-04 NOTE — PATIENT INSTRUCTIONS

## 2017-08-04 NOTE — PROGRESS NOTES
1. Have you been to the ER, urgent care clinic since your last visit? Hospitalized since your last visit? No.     2. Have you seen or consulted any other health care providers outside of the [address] [place] [address] since your last visit? Include any pap smears or colon screening. No.    Patient presents with follow up nicotine dependence.

## 2017-08-04 NOTE — PROGRESS NOTES
Jacquelin Mansfield is a 49 y.o. male and presents with Follow Up Chronic Condition and Nicotine Dependence (On chantix)       Subjective: Tobacco- stopped on 7/24/17- using patch also at 14mg. Having some outbursts but states work colleagues have commented on how calm he is. Feels some tightness of breathing- and has used albuterol in the past with reactive airway. Assessment/Plan:      Tobacco- encouraged chantix for at least 3 months. Discussed tapering of patch (this should be less effective given chantix)   Labs reviewed. Na sl elevated but was normal previously - recheck in 1-3 months. Refilled albuterol - if not improving pt will return. RTC in 2 nonths with labs. Orders Placed This Encounter    METABOLIC PANEL, BASIC     Standing Status:   Future     Standing Expiration Date:   8/5/2018    albuterol (PROVENTIL HFA, VENTOLIN HFA, PROAIR HFA) 90 mcg/actuation inhaler     Sig: Take 1 Puff by inhalation every six (6) hours as needed for Wheezing. Dispense:  1 Inhaler     Refill:  1           ROS:  Negative except as mentioned above  Cardiac-  Pulmonary-  GI-    SH:  Social History   Substance Use Topics    Smoking status: Current Every Day Smoker     Packs/day: 1.00     Years: 30.00     Types: Cigarettes    Smokeless tobacco: Never Used    Alcohol use No      Comment: recoverin alcoholic         Medications/Allergies:  Current Outpatient Prescriptions on File Prior to Visit   Medication Sig Dispense Refill    OM 3-E-Linol-ALA-Oleic-GLA-Lip (OMEGA 3-6-9) 40-60-10 mg-mg-unit cap Take  by mouth daily.  B.infantis-B.ani-B.long-B.bifi (PROBIOTIC 4X) 10-15 mg TbEC Take  by mouth daily.       varenicline (CHANTIX STARTER TAHIR) 0.5 mg (11)- 1 mg (42) DsPk Follow starter pack directions 1 Dose Pack 0    varenicline (CHANTIX) 1 mg tablet One tab PO BID 60 Tab 4    rosuvastatin (CRESTOR) 40 mg tablet take 1 tablet by mouth at bedtime 90 Tab 3    lisinopril-hydroCHLOROthiazide (PRINZIDE, ZESTORETIC) 20-12.5 mg per tablet Take 2 Tabs by mouth daily. 180 Tab 3    plant stanol jerrell (CHOLEST OFF) 450 mg tab Take 1 Tab by mouth daily. 1 Tab 0    Glucosamine &Chondroit-MV-Min3 970-412-81-0.5 mg tab Take 1 Tab by mouth daily. 1 Tab 0    amLODIPine (NORVASC) 10 mg tablet take 1 tablets by mouth once daily 90 Tab 3    metoprolol succinate (TOPROL-XL) 100 mg tablet take 1 tablet by mouth once daily 90 Tab 3    omeprazole (PRILOSEC) 20 mg capsule take 1 capsule by mouth once daily 90 Cap 3    Cholecalciferol, Vitamin D3, (VITAMIN D3) 1,000 unit cap Take  by mouth.  multivitamin (ONE A DAY) tablet Take 1 Tab by mouth daily. No current facility-administered medications on file prior to visit. Allergies   Allergen Reactions    Strattera [Atomoxetine] Other (comments)     Prostatitus    Sulfa (Sulfonamide Antibiotics) Hives and Itching    Venom-Wasp Swelling       Objective:  Visit Vitals    /68    Pulse 67    Temp 96.9 °F (36.1 °C) (Oral)    Resp 16    Ht 5' 10\" (1.778 m)    Wt 243 lb 6.4 oz (110.4 kg)    BMI 34.92 kg/m2    Body mass index is 34.92 kg/(m^2). Constitutional: Well developed, nourished, no distress, alert   HENT: Exterior ears and tympanic membranes normal bilaterally. Supple neck. No thyromegaly or lymphadenopathy. Oropharynx clear and moist mucous membranes. Eyes: Conjunctiva normal. PERRL. CV: S1, S2.  RRR. No murmurs/rubs. No thrills palpated. No carotid bruits. Intact distal pulses. No edema. Pulm: No abnormalities on inspection. Clear to auscultation bilaterally. No wheezing/rhonchi. Normal effort. GI: Soft, nontender, nondistended. Normal active bowel sounds. No  masses on palpation. No hepatosplenomegaly.

## 2017-08-04 NOTE — MR AVS SNAPSHOT
Visit Information Date & Time Provider Department Dept. Phone Encounter #  
 8/4/2017  7:45 AM Rosy Fernandez, [facility] [phone] [id number] Follow-up Instructions Return in about 2 months (around 10/4/2017) for 30 minute slot. Upcoming Health Maintenance Date Due Hepatitis C Screening 1960 Pneumococcal 19-64 Highest Risk (1 of 3 - PCV13) 10/23/1981 DTaP/Tdap/Td series (1 - Tdap) 10/23/1983 FOBT Q 1 YEAR AGE 50-75 10/23/2012 INFLUENZA AGE 9 TO ADULT 8/1/2017 Allergies as of 8/4/2017  Review Complete On: 8/4/2017 By: Rosy Fernandez MD  
  
 Severity Noted Reaction Type Reactions Strattera [Atomoxetine]  06/20/2013    Other (comments) Prostatitus Sulfa (Sulfonamide Antibiotics)  06/20/2013    Hives, Itching Venom-wasp  07/29/2014    Swelling Current Immunizations  Reviewed on 6/21/2016 Name Date Influenza Vaccine 10/31/2014 Influenza Vaccine (Quad) PF 12/30/2015 Not reviewed this visit You Were Diagnosed With   
  
 Codes Comments Hypernatremia    -  Primary ICD-10-CM: E87.0 ICD-9-CM: 276.0 Tobacco abuse     ICD-10-CM: Z72.0 ICD-9-CM: 305.1 Vitals BP Pulse Temp Resp Height(growth percentile) Weight(growth percentile) 144/68 67 96.9 °F (36.1 °C) (Oral) 16 5' 10\" (1.778 m) 243 lb 6.4 oz (110.4 kg) BMI Smoking Status 34.92 kg/m2 Current Every Day Smoker BMI and BSA Data Body Mass Index Body Surface Area 34.92 kg/m 2 2.34 m 2 Preferred Pharmacy Pharmacy Name Phone [address] [address] [address] [facility] [phone] Your Updated Medication List  
  
   
This list is accurate as of: 8/4/17  8:22 AM.  Always use your most recent med list.  
  
  
  
  
 albuterol 90 mcg/actuation inhaler Commonly known as:  PROVENTIL HFA, VENTOLIN HFA, PROAIR HFA  
 Take 1 Puff by inhalation every six (6) hours as needed for Wheezing. amLODIPine 10 mg tablet Commonly known as:  NORVASC  
take 1 tablets by mouth once daily Glucosamine &Chondroit-MV-Min3 494-757-13-0.5 mg Tab Take 1 Tab by mouth daily. lisinopril-hydroCHLOROthiazide 20-12.5 mg per tablet Commonly known as:  Marlaine Nelli Take 2 Tabs by mouth daily. metoprolol succinate 100 mg tablet Commonly known as:  TOPROL-XL  
take 1 tablet by mouth once daily  
  
 multivitamin tablet Commonly known as:  ONE A DAY Take 1 Tab by mouth daily. OMEGA 3-6-9 40-60-10 mg-mg-unit Cap Generic drug:  OM 3-E-Linol-ALA-Oleic-GLA-Lip Take  by mouth daily. omeprazole 20 mg capsule Commonly known as:  PRILOSEC  
take 1 capsule by mouth once daily  
  
 plant stanol jerrell 450 mg Tab Commonly known as:  CHOLEST OFF Take 1 Tab by mouth daily. PROBIOTIC 4X 10-15 mg Tbec Generic drug:  B.infantis-B.ani-B.long-B.bifi Take  by mouth daily. rosuvastatin 40 mg tablet Commonly known as:  CRESTOR  
take 1 tablet by mouth at bedtime  
  
 varenicline 1 mg tablet Commonly known as:  Fritzi Keepers One tab PO BID  
  
 VITAMIN D3 1,000 unit Cap Generic drug:  cholecalciferol Take  by mouth. Prescriptions Sent to Pharmacy Refills  
 albuterol (PROVENTIL HFA, VENTOLIN HFA, PROAIR HFA) 90 mcg/actuation inhaler 1 Sig: Take 1 Puff by inhalation every six (6) hours as needed for Wheezing. Class: Normal  
 Pharmacy: [address] [place] [address], [address] [place] [address] #: [phone] Route: Inhalation Follow-up Instructions Return in about 2 months (around 10/4/2017) for 30 minute slot. To-Do List   
 08/04/2017 Lab:  METABOLIC PANEL, BASIC Patient Instructions Learning About Benefits From Quitting Smoking How does quitting smoking make you healthier? If you're thinking about quitting smoking, you may have a few reasons to be smoke-free. Your health may be one of them. · When you quit smoking, you lower your risks for cancer, lung disease, heart attack, stroke, blood vessel disease, and blindness from macular degeneration. · When you're smoke-free, you get sick less often, and you heal faster. You are less likely to get colds, flu, bronchitis, and pneumonia. · As a nonsmoker, you may find that your mood is better and you are less stressed. When and how will you feel healthier? Quitting has real health benefits that start from day 1 of being smoke-free. And the longer you stay smoke-free, the healthier you get and the better you feel. The first hours · After just 20 minutes, your blood pressure and heart rate go down. That means there's less stress on your heart and blood vessels. · Within 12 hours, the level of carbon monoxide in your blood drops back to normal. That makes room for more oxygen. With more oxygen in your body, you may notice that you have more energy than when you smoked. After 2 weeks · Your lungs start to work better. · Your risk of heart attack starts to drop. After 1 month · When your lungs are clear, you cough less and breathe deeper, so it's easier to be active. · Your sense of taste and smell return. That means you can enjoy food more than you have since you started smoking. Over the years · After 1 year, your risk of heart disease is half what it would be if you kept smoking. · After 5 years, your risk of stroke starts to shrink. Within a few years after that, it's about the same as if you'd never smoked. · After 10 years, your risk of dying from lung cancer is cut by about half. And your risk for many other types of cancer is lower too. How would quitting help others in your life? When you quit smoking, you improve the health of everyone who now breathes in your smoke. · Their heart, lung, and cancer risks drop, much like yours. · They are sick less. For babies and small children, living smoke-free means they're less likely to have ear infections, pneumonia, and bronchitis. · If you're a woman who is or will be pregnant someday, quitting smoking means a healthier . · Children who are close to you are less likely to become adult smokers. Where can you learn more? Go to http://cristo-german.info/. Enter [id number] [id number] 11 in the search box to learn more about \"Learning About Benefits From Quitting Smoking. \" Current as of: 2017 Content Version: 11.3 © 5056-4838 [employer]. Care instructions adapted under license by [employer] (which disclaims liability or warranty for this information). If you have questions about a medical condition or this instruction, always ask your healthcare professional. [place] [address] any warranty or liability for your use of this information. Introducing [facility]! Dear Maricruz Zuniga: Thank you for requesting a [employer] account. Our records indicate that you already have an active [employer] account. You can access your account anytime at [email or web address] Did you know that you can access your hospital and ER discharge instructions at any time in [employer]? You can also review all of your test results from your hospital stay or ER visit. Additional Information If you have questions, please visit the Frequently Asked Questions section of the [employer] website at [email or web address] Remember, [employer] is NOT to be used for urgent needs. For medical emergencies, dial 911. Now available from your iPhone and Android! Please provide this summary of care documentation to your next provider. Your primary care clinician is listed as AKSHAT Cabrera.  If you have any questions after today's visit, please call [phone].

## 2017-10-04 ENCOUNTER — OFFICE VISIT (OUTPATIENT)
Dept: FAMILY MEDICINE CLINIC | Age: 57
End: 2017-10-04

## 2017-10-04 VITALS
DIASTOLIC BLOOD PRESSURE: 74 MMHG | HEIGHT: 70 IN | WEIGHT: 251.2 LBS | SYSTOLIC BLOOD PRESSURE: 133 MMHG | HEART RATE: 70 BPM | TEMPERATURE: 96.9 F | BODY MASS INDEX: 35.96 KG/M2 | RESPIRATION RATE: 16 BRPM

## 2017-10-04 DIAGNOSIS — I10 ESSENTIAL HYPERTENSION: ICD-10-CM

## 2017-10-04 DIAGNOSIS — I10 ESSENTIAL HYPERTENSION: Primary | ICD-10-CM

## 2017-10-04 DIAGNOSIS — Z23 ENCOUNTER FOR IMMUNIZATION: ICD-10-CM

## 2017-10-04 DIAGNOSIS — E66.9 OBESITY (BMI 30-39.9): ICD-10-CM

## 2017-10-04 DIAGNOSIS — F10.21 ALCOHOLISM IN REMISSION (HCC): ICD-10-CM

## 2017-10-04 DIAGNOSIS — Z12.11 COLON CANCER SCREENING: ICD-10-CM

## 2017-10-04 RX ORDER — UREA 10 %
100 LOTION (ML) TOPICAL DAILY
COMMUNITY

## 2017-10-04 RX ORDER — AMLODIPINE BESYLATE 5 MG/1
TABLET ORAL
Qty: 90 TAB | Refills: 1 | Status: SHIPPED | OUTPATIENT
Start: 2017-10-04 | End: 2018-04-09 | Stop reason: SDUPTHER

## 2017-10-04 NOTE — MR AVS SNAPSHOT
Visit Information Date & Time Provider Department Dept. Phone Encounter #  
 10/4/2017  8:30 AM Sharmin Ferrara, [address] [place] [address] [phone] [id number] Follow-up Instructions Return in about 2 months (around 12/4/2017) for 30 minute slot. Upcoming Health Maintenance Date Due COLONOSCOPY 10/23/1980 Pneumococcal 19-64 Highest Risk (1 of 3 - PCV13) 10/23/1981 DTaP/Tdap/Td series (1 - Tdap) 10/23/1983 Allergies as of 10/4/2017  Review Complete On: 10/4/2017 By: Sharmin Ferrara MD  
  
 Severity Noted Reaction Type Reactions Strattera [Atomoxetine]  06/20/2013    Other (comments) Prostatitus Sulfa (Sulfonamide Antibiotics)  06/20/2013    Hives, Itching Venom-wasp  07/29/2014    Swelling Current Immunizations  Reviewed on 6/21/2016 Name Date Influenza Vaccine 10/31/2014 Influenza Vaccine (Quad) PF 12/30/2015 Pneumococcal Polysaccharide (PPSV-23)  Incomplete Tdap  Incomplete Not reviewed this visit You Were Diagnosed With   
  
 Codes Comments Essential hypertension    -  Primary ICD-10-CM: I10 
ICD-9-CM: 401.9 Obesity (BMI 30-39. 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  
  
   
 This list is accurate as of: 10/4/17  9:10 AM.  Always use your most recent med list.  
  
  
  
  
 albuterol 90 mcg/actuation inhaler Commonly known as:  PROVENTIL HFA, VENTOLIN HFA, PROAIR HFA Take 1 Puff by inhalation every six (6) hours as needed for Wheezing. amLODIPine 5 mg tablet Commonly known as:  NORVASC  
take 1 tablets by mouth once daily Glucosamine &Chondroit-MV-Min3 317-297-64-0.5 mg Tab Take 1 Tab by mouth daily. lisinopril-hydroCHLOROthiazide 20-12.5 mg per tablet Commonly known as:  Loreto [place] Take 2 Tabs by mouth daily. metoprolol succinate 100 mg tablet Commonly known as:  TOPROL-XL  
take 1 tablet by mouth once daily  
  
 multivitamin tablet Commonly known as:  ONE A DAY Take 1 Tab by mouth daily. OMEGA 3-6-9 40-60-10 mg-mg-unit Cap Generic drug:  OM 3-E-Linol-ALA-Oleic-GLA-Lip Take  by mouth daily. omeprazole 20 mg capsule Commonly known as:  PRILOSEC  
take 1 capsule by mouth once daily  
  
 plant stanol jerrell 450 mg Tab Commonly known as:  CHOLEST OFF Take 1 Tab by mouth daily. PROBIOTIC 4X 10-15 mg Tbec Generic drug:  B.infantis-B.ani-B.long-B.bifi Take  by mouth daily. rosuvastatin 40 mg tablet Commonly known as:  CRESTOR  
take 1 tablet by mouth at bedtime VITAMIN B-12 100 mcg tablet Generic drug:  cyanocobalamin Take 100 mcg by mouth daily. VITAMIN D3 1,000 unit Cap Generic drug:  cholecalciferol Take  by mouth. Prescriptions Sent to Pharmacy Refills  
 amLODIPine (NORVASC) 5 mg tablet 1 Sig: take 1 tablets by mouth once daily Class: Normal  
 Pharmacy: [id number] [facility], [address] [place] [address] #: [phone] We Performed the Following PNEUMOCOCCAL POLYSACCHARIDE VACCINE, 23-VALENT, ADULT OR IMMUNOSUPPRESSED PT DOSE, [43073 CPT(R)]  TETANUS, DIPHTHERIA TOXOIDS AND ACELLULAR PERTUSSIS VACCINE (TDAP), IN INDIVIDS. >=7, IM [id number] CPT(R)] Follow-up Instructions Return in about 2 months (around 12/4/2017) for 30 minute slot. To-Do List   
 10/04/2017 Lab:  METABOLIC PANEL, BASIC Patient Instructions Vaccine Information Statement DTaP (Tetanus, Diphtheria, Pertussis ) Vaccine: What you need to know Many Vaccine Information Statements are available in [place]i and other languages. See www.immunize.org/vis Hojas de Informacián Sobre Vacunas están disponibles en Español y en muchos otros idiomas. [facility].si 1. Why get vaccinated? Diphtheria, tetanus, and pertussis are serious diseases caused by bacteria. Diphtheria and pertussis are spread from person to person. Tetanus enters the body through cuts or wounds. DIPHTHERIA causes a thick covering in the back of the throat.  It can lead to breathing problems, paralysis, heart failure, and even death. TETANUS (Lockjaw) causes painful tightening of the muscles, usually all over the body.  It can lead to locking of the jaw so the victim cannot open his mouth or swallow. Tetanus leads to death in up to 2 out of 10 cases. PERTUSSIS (Whooping Cough) causes coughing spells so bad that it is hard for infants to eat, drink, or breathe. These spells can last for weeks.  It can lead to pneumonia, seizures (jerking and staring spells), brain damage, and death. Diphtheria, tetanus, and pertussis vaccine (DTaP) can help prevent these diseases. Most children who are vaccinated with DTaP will be protected throughout childhood. Many more children would get these diseases if we stopped vaccinating. DTaP is a safer version of an older vaccine called DTP. DTP is no longer used in the [place]. 2. Who should get DTaP vaccine and when? Children should get 5 doses of DTaP vaccine, one dose at each of the following ages:  2 months  4 months  6 months  1518 months  46 years DTaP may be given at the same time as other vaccines. 3. Some children should not get DTaP vaccine or should wait  Children with minor illnesses, such as a cold, may be vaccinated. But children who are moderately or severely ill should usually wait until they recover before getting DTaP vaccine.  Any child who had a life-threatening allergic reaction after a dose of DTaP should not get another dose.  Any child who suffered a brain or nervous system disease within 7 days after a dose of DTaP should not get another dose.  Talk with your doctor if your child: 
- had a seizure or collapsed after a dose of DTaP, 
- cried non-stop for 3 hours or more after a dose of DTaP,  
- had a fever over 105°F after a dose of DTaP. Ask your doctor for more information. Some of these children should not get another dose of pertussis vaccine, but may get a vaccine without pertussis, called DT. 4. Older children and adults DTaP is not licensed for adolescents, adults, or children 9years of age and older. But older people still need protection. A vaccine called Tdap is similar to DTaP. A single dose of Tdap is recommended for people 11 through 59years of age. Another vaccine, called Td, protects against tetanus and diphtheria, but not pertussis. It is recommended every 10 years. There are separate Vaccine Information Statements for these vaccines. 5. What are the risks from DTaP vaccine? Getting diphtheria, tetanus, or pertussis disease is much riskier than getting DTaP vaccine. However, a vaccine, like any medicine, is capable of causing serious problems, such as severe allergic reactions. The risk of DTaP vaccine causing serious harm, or death, is extremely small. Mild problems (common)  Fever (up to about 1 child in 3)  Redness or swelling where the shot was given (up to about 1 child in 4)  Soreness or tenderness where the shot was given (up to about 1 child in 4) These problems occur more often after the 4th and 5th doses of the DTaP series than after earlier doses. Sometimes the 4th or 5th dose of DTaP vaccine is followed by swelling of the entire arm or leg in which the shot was given, lasting 17 days (up to about 1 child in 27). Other mild problems include:  Fussiness (up to about 1 child in 3)  Tiredness or poor appetite (up to about 1 child in 10)  Vomiting (up to about 1 child in 48) These problems generally occur 13 days after the shot. Moderate problems (uncommon)  Seizure (jerking or staring) (about 1 child out of 14,000)  Non-stop crying, for 3 hours or more (up to about 1 child out of 1,000)  High fever, over 105°F (about 1 child out of 16,000) Severe problems (very rare)  Serious allergic reaction (less than 1 out of a million doses)  Several other severe problems have been reported after DTaP vaccine. These include: - Long-term seizures, coma, or lowered consciousness - Permanent brain damage. These are so rare it is hard to tell if they are caused by the vaccine. Controlling fever is especially important for children who have had seizures, for any reason. It is also important if another family member has had seizures. You can reduce fever and pain by giving your child an aspirin-free pain reliever when the shot is given, and for the next 24 hours, following the package instructions. 6. What if there is a serious reaction? What should I look for?  Look for anything that concerns you, such as signs of a severe allergic reaction, very high fever, or behavior changes. Signs of a severe allergic reaction can include hives, swelling of the face and throat, difficulty breathing, a fast heartbeat, dizziness, and weakness. These would start a few minutes to a few hours after the vaccination. What should I do?  
 If you think it is a severe allergic reaction or other emergency that cant wait, call 9-1-1 or get the person to the nearest hospital. Otherwise, call your doctor.  Afterward, the reaction should be reported to the Vaccine Adverse Event Reporting System (VAERS). Your doctor might file this report, or you can do it yourself through the VAERS web site at www.vaers. [facility].gov, or by calling 3-673.789.8609. VAERS is only for reporting reactions. They do not give medical advice. 7. The National Vaccine Injury Compensation Program 
 
The [facility] Vaccine Injury Compensation Program (VICP) is a federal program that was created to compensate people who may have been injured by certain vaccines. Persons who believe they may have been injured by a vaccine can learn about the program and about filing a claim by calling 9-552.604.9260 or visiting the [employer] website at www.[facility].gov/vaccinecompensation. 8. How can I learn more? Ask your doctor.  Call your local or state health department.  Contact the Centers for Disease Control and Prevention (CDC): 
- Call 6-914.308.5814 (0-798-NSM-INFO) or 
- Visit CDCs website at www.cdc.gov/vaccines Vaccine Information Statement DTaP (Tetanus, Diphtheria, Pertussis ) Vaccine  
(5/17/2007) [address] [facility] [address] [facility] and [employer] Centers for Disease Control and Prevention Office Use Only Vaccine Information Statement Pneumococcal Polysaccharide Vaccine: What You Need to Know Many Vaccine Information Statements are available in [place]ian and other languages. See www.immunize.org/vis. Hojas de información Sobre Vacunas están disponibles en español y en muchos otros idiomas. [facility].si. 1. Why get vaccinated? Vaccination can protect older adults (and some children and younger adults) from pneumococcal disease. Pneumococcal disease is caused by bacteria that can spread from person to person through close contact.   It can cause ear infections, and it can also lead to more serious infections of the: 
 Lungs (pneumonia),  Blood (bacteremia), and 
 Covering of the brain and spinal cord (meningitis). Meningitis can cause deafness and brain damage, and it can be fatal.   
 
Anyone can get pneumococcal disease, but children under 3years of age, people with certain medical conditions, adults over 72years of age, and cigarette smokers are at the highest risk. About 18,000 older adults die each year from pneumococcal disease in the [place]. Treatment of pneumococcal infections with penicillin and other drugs used to be more effective. But some strains of the disease have become resistant to these drugs. This makes prevention of the disease, through vaccination, even more important. 2. Pneumococcal polysaccharide vaccine (PPSV23) Pneumococcal polysaccharide vaccine (PPSV23) protects against 23 types of pneumococcal bacteria. It will not prevent all pneumococcal disease. PPSV23 is recommended for:  All adults 72years of age and older,  Anyone 2 through 59years of age with certain long-term health problems, 
 Anyone 2 through 59years of age with a weakened immune system, 
 Adults 23 through 59years of age who smoke cigarettes or have asthma. Most people need only one dose of PPSV. A second dose is recommended for certain high-risk groups. People 72 and older should get a dose even if they have gotten one or more doses of the vaccine before they turned 65. Your healthcare provider can give you more information about these recommendations. Most healthy adults develop protection within 2 to 3 weeks of getting the shot. 3. Some people should not get this vaccine  Anyone who has had a life-threatening allergic reaction to PPSV should not get another dose.  Anyone who has a severe allergy to any component of PPSV should not receive it. Tell your provider if you have any severe allergies.  Anyone who is moderately or severely ill when the shot is scheduled may be asked to wait until they recover before getting the vaccine. Someone with a mild illness can usually be vaccinated.  Children less than 3years of age should not receive this vaccine.  There is no evidence that PPSV is harmful to either a pregnant woman or to her fetus. However, as a precaution, women who need the vaccine should be vaccinated before becoming pregnant, if possible. 4. Risks of a vaccine reaction With any medicine, including vaccines, there is a chance of side effects. These are usually mild and go away on their own, but serious reactions are also possible. About half of people who get PPSV have mild side effects, such as redness or pain where the shot is given, which go away within about two days. Less than 1 out of 100 people develop a fever, muscle aches, or more severe local reactions. Problems that could happen after any vaccine:  People sometimes faint after a medical procedure, including vaccination. Sitting or lying down for about 15 minutes can help prevent fainting, and injuries caused by a fall. Tell your doctor if you feel dizzy, or have vision changes or ringing in the ears.  Some people get severe pain in the shoulder and have difficulty moving the arm where a shot was given. This happens very rarely.  Any medication can cause a severe allergic reaction. Such reactions from a vaccine are very rare, estimated at about 1 in a million doses, and would happen within a few minutes to a few hours after the vaccination. As with any medicine, there is a very remote chance of a vaccine causing a serious injury or death. The safety of vaccines is always being monitored. For more information, visit: www.cdc.gov/vaccinesafety/  
 
5. What if there is a serious reaction? What should I look for?  
 
Look for anything that concerns you, such as signs of a severe allergic reaction, very high fever, or unusual behavior. Signs of a severe allergic reaction can include hives, swelling of the face and throat, difficulty breathing, a fast heartbeat, dizziness, and weakness. These would usually start a few minutes to a few hours after the vaccination. What should I do? If you think it is a severe allergic reaction or other emergency that cant wait, call 9-1-1 or get to the nearest hospital. Otherwise, call your doctor. Afterward, the reaction should be reported to the Vaccine Adverse Event Reporting System (VAERS). Your doctor might file this report, or you can do it yourself through the VAERS web site at www.vaers. [facility].gov, or by calling 6-389.129.4204. Triage does not give medical advice. 6. How can I learn more?  Ask your doctor. He or she can give you the vaccine package insert or suggest other sources of information.  Call your local or state health department.  Contact the Centers for Disease Control and Prevention (CDC): 
- Call 7-602.830.3313 (1-800-CDC-INFO) or 
- Visit CDCs website at www.cdc.gov/vaccines Vaccine Information Statement PPSV  
04/24/2015 Department of [facility] and [employer] Centers for Disease Control and Prevention Office Use Only [facility]! Dear Bal Penny: Thank you for requesting a [employer] account. Our records indicate that you already have an active [employer] account. You can access your account anytime at [email or web address] Did you know that you can access your hospital and ER discharge instructions at any time in [employer]? You can also review all of your test results from your hospital stay or ER visit. Additional Information If you have questions, please visit the Frequently Asked Questions section of the [employer] website at [email or web address] Remember, [employer] is NOT to be used for urgent needs. For medical emergencies, dial 911. Now available from your iPhone and Android! Please provide this summary of care documentation to your next provider. Your primary care clinician is listed as AKSHAT Cabrera. If you have any questions after today's visit, please call [phone].

## 2017-10-04 NOTE — PATIENT INSTRUCTIONS
Vaccine Information Statement    DTaP (Tetanus, Diphtheria, Pertussis ) Vaccine: What you need to know     Many Vaccine Information Statements are available in Faroese and other languages. See www.immunize.org/vis  Hojas de Informacián Sobre Vacunas están disponibles en Español y en muchos otros idiomas. Visite Miller.[email or web address]      1. Why get vaccinated? Diphtheria, tetanus, and pertussis are serious diseases caused by bacteria. Diphtheria and pertussis are spread from person to person. Tetanus enters the body through cuts or wounds. DIPHTHERIA causes a thick covering in the back of the throat.  It can lead to breathing problems, paralysis, heart failure, and even death. TETANUS (Lockjaw) causes painful tightening of the muscles, usually all over the body.  It can lead to locking of the jaw so the victim cannot open his mouth or swallow. Tetanus leads to death in up to 2 out of 10 cases. PERTUSSIS (Whooping Cough) causes coughing spells so bad that it is hard for infants to eat, drink, or breathe. These spells can last for weeks.  It can lead to pneumonia, seizures (jerking and staring spells), brain damage, and death. Diphtheria, tetanus, and pertussis vaccine (DTaP) can help prevent these diseases. Most children who are vaccinated with DTaP will be protected throughout childhood. Many more children would get these diseases if we stopped vaccinating. DTaP is a safer version of an older vaccine called DTP. DTP is no longer used in the [place]. 2. Who should get DTaP vaccine and when? Children should get 5 doses of DTaP vaccine, one dose at each of the following ages:   2 months   4 months   6 months   1518 months   46 years    DTaP may be given at the same time as other vaccines. 3. Some children should not get DTaP vaccine or should wait     Children with minor illnesses, such as a cold, may be vaccinated.  But children who are moderately or severely ill should usually wait until they recover before getting DTaP vaccine.  Any child who had a life-threatening allergic reaction after a dose of DTaP should not get another dose.  Any child who suffered a brain or nervous system disease within 7 days after a dose of DTaP should not get another dose.  Talk with your doctor if your child:  - had a seizure or collapsed after a dose of DTaP,  - cried non-stop for 3 hours or more after a dose of DTaP,   - had a fever over 105°F after a dose of DTaP. Ask your doctor for more information. Some of these children should not get another dose of pertussis vaccine, but may get a vaccine without pertussis, called DT. 4. Older children and adults    DTaP is not licensed for adolescents, adults, or children 9years of age and older. But older people still need protection. A vaccine called Tdap is similar to DTaP. A single dose of Tdap is recommended for people 11 through 59years of age. Another vaccine, called Td, protects against tetanus and diphtheria, but not pertussis. It is recommended every 10 years. There are separate Vaccine Information Statements for these vaccines. 5. What are the risks from DTaP vaccine? Getting diphtheria, tetanus, or pertussis disease is much riskier than getting DTaP vaccine. However, a vaccine, like any medicine, is capable of causing serious problems, such as severe allergic reactions. The risk of DTaP vaccine causing serious harm, or death, is extremely small. Mild problems (common)   Fever (up to about 1 child in 4)   Redness or swelling where the shot was given (up to about 1 child in 4)   Soreness or tenderness where the shot was given (up to about 1 child in 4)    These problems occur more often after the 4th and 5th doses of the DTaP series than after earlier doses.  Sometimes the 4th or 5th dose of DTaP vaccine is followed by swelling of the entire arm or leg in which the shot was given, lasting 17 days (up to about 1 child in 27). Other mild problems include:   Fussiness (up to about 1 child in 3)   Tiredness or poor appetite (up to about 1 child in 10)   Vomiting (up to about 1 child in 48)    These problems generally occur 13 days after the shot. Moderate problems (uncommon)   Seizure (jerking or staring) (about 1 child out of 14,000)   Non-stop crying, for 3 hours or more (up to about 1 child out of 1,000)   High fever, over 105°F (about 1 child out of 16,000)    Severe problems (very rare)   Serious allergic reaction (less than 1 out of a million doses)   Several other severe problems have been reported after DTaP vaccine. These include:  - Long-term seizures, coma, or lowered consciousness  - Permanent brain damage. These are so rare it is hard to tell if they are caused by the vaccine. Controlling fever is especially important for children who have had seizures, for any reason. It is also important if another family member has had seizures. You can reduce fever and pain by giving your child an aspirin-free pain reliever when the shot is given, and for the next 24 hours, following the package instructions. 6. What if there is a serious reaction? What should I look for?  Look for anything that concerns you, such as signs of a severe allergic reaction, very high fever, or behavior changes. Signs of a severe allergic reaction can include hives, swelling of the face and throat, difficulty breathing, a fast heartbeat, dizziness, and weakness. These would start a few minutes to a few hours after the vaccination. What should I do?  If you think it is a severe allergic reaction or other emergency that cant wait, call 9-1-1 or get the person to the nearest hospital. Otherwise, call your doctor.  Afterward, the reaction should be reported to the Vaccine Adverse Event Reporting System (VAERS).  Your doctor might file this report, or you can do it yourself through the [employer] web site at YouFetch. GetBack.gov, or by calling 7-248.853.6349. VAERS is only for reporting reactions. They do not give medical advice. 7. The National Vaccine Injury Compensation Program    The [employer] Vaccine Injury Compensation Program (VICP) is a federal program that was created to compensate people who may have been injured by certain vaccines. Persons who believe they may have been injured by a vaccine can learn about the program and about filing a claim by calling 1-241.591.7274 or visiting the [employer] website at www.[facility].gov/vaccinecompensation. 8. How can I learn more? Ask your doctor.  Call your local or state health department.  Contact the Centers for Disease Control and   Prevention (CDC):  - Call 2-161.317.1687 (5-297-HPB-INFO) or  - Visit CDCs website at www.cdc.gov/vaccines      Vaccine Information Statement  DTaP (Tetanus, Diphtheria, Pertussis ) Vaccine   (5/17/2007)   42 UMatthew Cortez 564AA-07    Department of Health and Human Services  Centers for Disease Control and Prevention    Office Use Only    Vaccine Information Statement    Pneumococcal Polysaccharide Vaccine: What You Need to Know    Many Vaccine Information Statements are available in Welsh and other languages. See www.immunize.org/vis. Hojas de información Sobre Vacunas están disponibles en español y en muchos otros idiomas. Visite Miller.si. 1. Why get vaccinated? Vaccination can protect older adults (and some children and younger adults) from pneumococcal disease. Pneumococcal disease is caused by bacteria that can spread from person to person through close contact. It can cause ear infections, and it can also lead to more serious infections of the:   Lungs (pneumonia),   Blood (bacteremia), and   Covering of the brain and spinal cord (meningitis).  Meningitis can cause deafness and brain damage, and it can be fatal.      Anyone can get pneumococcal disease, but children under 2 years of age, people with certain medical conditions, adults over 72years of age, and cigarette smokers are at the highest risk. About 18,000 older adults die each year from pneumococcal disease in the [place]. Treatment of pneumococcal infections with penicillin and other drugs used to be more effective. But some strains of the disease have become resistant to these drugs. This makes prevention of the disease, through vaccination, even more important. 2. Pneumococcal polysaccharide vaccine (PPSV23)    Pneumococcal polysaccharide vaccine (PPSV23) protects against 23 types of pneumococcal bacteria. It will not prevent all pneumococcal disease. PPSV23 is recommended for:   All adults 72years of age and older,   Anyone 2 through 59years of age with certain long-term health problems,   Anyone 2 through 59years of age with a weakened immune system,   Adults 23 through 59years of age who smoke cigarettes or have asthma. Most people need only one dose of PPSV. A second dose is recommended for certain high-risk groups. People 72 and older should get a dose even if they have gotten one or more doses of the vaccine before they turned 65. Your healthcare provider can give you more information about these recommendations. Most healthy adults develop protection within 2 to 3 weeks of getting the shot. 3. Some people should not get this vaccine     Anyone who has had a life-threatening allergic reaction to PPSV should not get another dose.  Anyone who has a severe allergy to any component of PPSV should not receive it. Tell your provider if you have any severe allergies.  Anyone who is moderately or severely ill when the shot is scheduled may be asked to wait until they recover before getting the vaccine. Someone with a mild illness can usually be vaccinated.  Children less than 3years of age should not receive this vaccine.      There is no evidence that PPSV is harmful to either a pregnant woman or to her fetus. However, as a precaution, women who need the vaccine should be vaccinated before becoming pregnant, if possible. 4. Risks of a vaccine reaction    With any medicine, including vaccines, there is a chance of side effects. These are usually mild and go away on their own, but serious reactions are also possible. About half of people who get PPSV have mild side effects, such as redness or pain where the shot is given, which go away within about two days. Less than 1 out of 100 people develop a fever, muscle aches, or more severe local reactions. Problems that could happen after any vaccine:     People sometimes faint after a medical procedure, including vaccination. Sitting or lying down for about 15 minutes can help prevent fainting, and injuries caused by a fall. Tell your doctor if you feel dizzy, or have vision changes or ringing in the ears.  Some people get severe pain in the shoulder and have difficulty moving the arm where a shot was given. This happens very rarely.  Any medication can cause a severe allergic reaction. Such reactions from a vaccine are very rare, estimated at about 1 in a million doses, and would happen within a few minutes to a few hours after the vaccination. As with any medicine, there is a very remote chance of a vaccine causing a serious injury or death. The safety of vaccines is always being monitored. For more information, visit: www.cdc.gov/vaccinesafety/     5. What if there is a serious reaction? What should I look for? Look for anything that concerns you, such as signs of a severe allergic reaction, very high fever, or unusual behavior. Signs of a severe allergic reaction can include hives, swelling of the face and throat, difficulty breathing, a fast heartbeat, dizziness, and weakness. These would usually start a few minutes to a few hours after the vaccination. What should I do?     If you think it is a severe allergic reaction or other emergency that cant wait, call 9-1-1 or get to the nearest hospital. Otherwise, call your doctor. Afterward, the reaction should be reported to the Vaccine Adverse Event Reporting System (VAERS). Your doctor might file this report, or you can do it yourself through the VAERS web site at www.vaers. [facility].gov, or by calling 4-609.622.9775. VAERS does not give medical advice. 6. How can I learn more?  Ask your doctor. He or she can give you the vaccine package insert or suggest other sources of information.  Call your local or state health department.    Contact the Centers for Disease Control and Prevention (CDC):  - Call 7-937.761.7388 (1-321-FVF-INFO) or  - Visit CDCs website at Calvin 18 04/24/2015    Department of Health and [facility] for Disease Control and Prevention    Office Use Only

## 2017-10-04 NOTE — PROGRESS NOTES
1. Have you been to the ER, urgent care clinic since your last visit? Hospitalized since your last visit? 2. Have you seen or consulted any other health care providers outside of the [address] [place] [address] since your last visit? Include any pap smears or colon screening. Chief Complaint   Patient presents with    Follow Up Chronic Condition    Nicotine Dependence    Leg Swelling     leg cramps.      Weight Gain

## 2017-10-04 NOTE — PROGRESS NOTES
Monica Antunez is a 49 y.o. male and presents with Follow Up Chronic Condition; Nicotine Dependence; Leg Swelling (leg cramps. ); and Weight Gain       Subjective:    Some edema in legs. Walking at lunch. Also getting some  Cramping in legs. Alcoholismthis continues to be in remission since 2010  [address] records and hepatitis C was done previously and entered into health maintenance chart    Assessment/Plan:    Obesity- encouraged caloric restriction. Discussed regular weights and goal of 1 pound per week weight loss. Exercise encouraged  Cramping- check electrolytes. Hm - had flu shot, needs pneumonia vaccine    RTC in 2 months. Orders Placed This Encounter    cyanocobalamin (VITAMIN B-12) 100 mcg tablet     Sig: Take 100 mcg by mouth daily. Diagnoses and all orders for this visit:    1. Essential hypertension  -     METABOLIC PANEL, BASIC; Future    2. Obesity (BMI 30-39.9)    3. Encounter for immunization  -     Pneumococcal polysaccharide vaccine, 23-valent, adult or immunosuppressed pt dose  -     Tetanus, diphtheria toxoids and acellular pertussis (TDAP) vaccine, in individuals >=7 years, IM    4. Colon cancer screening- done 2013; due 2014 due to adenomatous polyp  -     REFERRAL TO GASTROENTEROLOGY    5. Alcoholism in remission [facility])  Comments:  quit in 2010    Other orders  -     amLODIPine (NORVASC) 5 mg tablet; take 1 tablets by mouth once daily            ROS:  Negative except as mentioned above  Cardiac- no chest pain or palpitations  Pulmonary- no sob or wheezes  GI- no n/v or diarrhea.       SH:  Social History   Substance Use Topics    Smoking status: Former Smoker     Packs/day: 1.00     Years: 30.00     Types: Cigarettes    Smokeless tobacco: Never Used    Alcohol use No      Comment: recoverin alcoholic         Medications/Allergies:  Current Outpatient Prescriptions on File Prior to Visit   Medication Sig Dispense Refill    albuterol (PROVENTIL HFA, VENTOLIN HFA, PROAIR HFA) 90 mcg/actuation inhaler Take 1 Puff by inhalation every six (6) hours as needed for Wheezing. 1 Inhaler 1    OM 3-E-Linol-ALA-Oleic-GLA-Lip (OMEGA 3-6-9) 40-60-10 mg-mg-unit cap Take  by mouth daily.  B.infantis-B.ani-B.long-B.bifi (PROBIOTIC 4X) 10-15 mg TbEC Take  by mouth daily.  rosuvastatin (CRESTOR) 40 mg tablet take 1 tablet by mouth at bedtime 90 Tab 3    lisinopril-hydroCHLOROthiazide (PRINZIDE, ZESTORETIC) 20-12.5 mg per tablet Take 2 Tabs by mouth daily. 180 Tab 3    plant stanol jerrell (CHOLEST OFF) 450 mg tab Take 1 Tab by mouth daily. 1 Tab 0    Glucosamine &Chondroit-MV-Min3 256-096-71-0.5 mg tab Take 1 Tab by mouth daily. 1 Tab 0    amLODIPine (NORVASC) 10 mg tablet take 1 tablets by mouth once daily 90 Tab 3    metoprolol succinate (TOPROL-XL) 100 mg tablet take 1 tablet by mouth once daily 90 Tab 3    omeprazole (PRILOSEC) 20 mg capsule take 1 capsule by mouth once daily 90 Cap 3    Cholecalciferol, Vitamin D3, (VITAMIN D3) 1,000 unit cap Take  by mouth.  multivitamin (ONE A DAY) tablet Take 1 Tab by mouth daily. No current facility-administered medications on file prior to visit. Allergies   Allergen Reactions    Strattera [Atomoxetine] Other (comments)     Prostatitus    Sulfa (Sulfonamide Antibiotics) Hives and Itching    Venom-Wasp Swelling       Objective:  Visit Vitals    /74    Pulse 70    Temp 96.9 °F (36.1 °C) (Oral)    Resp 16    Ht 5' 10\" (1.778 m)    Wt 251 lb 3.2 oz (113.9 kg)    BMI 36.04 kg/m2    Body mass index is 36.04 kg/(m^2). Constitutional: Well developed, nourished, no distress, alert   CV: S1, S2.  RRR. No murmurs/rubs. No thrills palpated. No carotid bruits. Intact distal pulses. No edema. Pulm: No abnormalities on inspection. Clear to auscultation bilaterally. No wheezing/rhonchi. Normal effort. GI: Soft, nontender, nondistended. Normal active bowel sounds.  No  masses on palpation. No hepatosplenomegaly.

## 2017-10-05 LAB
ANION GAP SERPL CALC-SCNC: 17 MMOL/L
BUN SERPL-MCNC: 14 MG/DL (ref 6–22)
CALCIUM SERPL-MCNC: 9.8 MG/DL (ref 8.4–10.4)
CHLORIDE SERPL-SCNC: 97 MMOL/L (ref 98–110)
CO2 SERPL-SCNC: 27 MMOL/L (ref 20–32)
CREAT SERPL-MCNC: 0.7 MG/DL (ref 0.5–1.2)
GFRAA, 66117: >60
GFRNA, 66118: >60
GLUCOSE SERPL-MCNC: 114 MG/DL (ref 70–99)
POTASSIUM SERPL-SCNC: 4.6 MMOL/L (ref 3.5–5.5)
SODIUM SERPL-SCNC: 141 MMOL/L (ref 133–145)

## 2017-12-04 ENCOUNTER — OFFICE VISIT (OUTPATIENT)
Dept: FAMILY MEDICINE CLINIC | Age: 57
End: 2017-12-04

## 2017-12-04 VITALS
RESPIRATION RATE: 16 BRPM | SYSTOLIC BLOOD PRESSURE: 159 MMHG | DIASTOLIC BLOOD PRESSURE: 79 MMHG | HEIGHT: 70 IN | TEMPERATURE: 96.8 F | HEART RATE: 70 BPM | BODY MASS INDEX: 36.36 KG/M2 | WEIGHT: 254 LBS

## 2017-12-04 DIAGNOSIS — R73.03 PREDIABETES: ICD-10-CM

## 2017-12-04 DIAGNOSIS — Z12.5 PROSTATE CANCER SCREENING: ICD-10-CM

## 2017-12-04 DIAGNOSIS — E66.9 OBESITY (BMI 30-39.9): ICD-10-CM

## 2017-12-04 DIAGNOSIS — I10 ESSENTIAL HYPERTENSION: Primary | ICD-10-CM

## 2017-12-04 DIAGNOSIS — F41.9 ANXIETY: ICD-10-CM

## 2017-12-04 DIAGNOSIS — F17.210 CIGARETTE NICOTINE DEPENDENCE WITHOUT COMPLICATION: ICD-10-CM

## 2017-12-04 DIAGNOSIS — I10 ESSENTIAL HYPERTENSION: ICD-10-CM

## 2017-12-04 DIAGNOSIS — F10.21 ALCOHOLISM IN REMISSION (HCC): ICD-10-CM

## 2017-12-04 NOTE — PROGRESS NOTES
1. Have you been to the ER, urgent care clinic since your last visit? Hospitalized since your last visit? No.     2. Have you seen or consulted any other health care providers outside of the [address] [place] [address] since your last visit? Include any pap smears or colon screening. No.    Chief Complaint   Patient presents with    Follow Up Chronic Condition    Obesity    Leg Swelling    Alcohol Problem    Results     labs    Behavioral Problem     Cannot control his temper.

## 2017-12-04 NOTE — PATIENT INSTRUCTIONS
Please call Dr. Jesus Momin for an appointment.    Address: [facility] # [phone], [place], [address] [place] [address] Road   Phone: [phone]

## 2017-12-04 NOTE — PROGRESS NOTES
Beverly Cavanaugh is a 56 y.o. male and presents with Follow Up Chronic Condition; Obesity; Leg Swelling; Alcohol Problem; Results (labs); and Behavioral Problem (Cannot control his temper. )       Subjective:    htn- reduced norvasc to 2.5mg. Edema resolved. No cp or sob or other sx. Stopped smoking- anxious and feels his temper is more labile at this point. Using chantix. Assessment/Plan:    htn- continue norvasc at 2.5 mg. Will bring Lisinopril HCTZ up to 40 mg/25mg. Nicotine withdrawl- offered psychiatry and psychology referral given hx. Pt wishes to see psychiatry - referral placed - pt will call, Dr Joao Michaels as he has seen him previously. Discussed chantix potential side effects vs nicotine withdrawl. [phone], [address]   Phone: [phone]      RTC in 3 months with labs prior. Orders Placed This Encounter    HEMOGLOBIN A1C WITH EAG     Standing Status:   Future     Standing Expiration Date:   65/7/3449    METABOLIC PANEL, BASIC     Standing Status:   Future     Standing Expiration Date:   12/5/2018    CBC W/O DIFF     Standing Status:   Future     Standing Expiration Date:   12/5/2018    PSA SCREENING (SCREENING)     Standing Status:   Future     Standing Expiration Date:   12/5/2018    REFERRAL TO PSYCHIATRY     Referral Priority:   Routine     Referral Type:   Behavioral Health     Referral Reason:   Specialty Services Required       Diagnoses and all orders for this visit:    1. Essential hypertension  -     METABOLIC PANEL, BASIC; Future  -     CBC W/O DIFF; Future    2. Prediabetes  -     HEMOGLOBIN A1C WITH EAG; Future    3. Obesity (BMI 90-77.9)  -     METABOLIC PANEL, BASIC; Future  -     CBC W/O DIFF; Future    4. Anxiety  -     REFERRAL TO PSYCHIATRY    5. Cigarette nicotine dependence without complication  -     REFERRAL TO PSYCHIATRY    6.  Alcoholism in remission ([facility] [address]  -     REFERRAL TO PSYCHIATRY    7. Prostate cancer screening  - PSA SCREENING (SCREENING); Future          ROS:  Negative except as mentioned above  Cardiac- no chest pain or palpitations  Pulmonary- no sob or wheezes  GI- no n/v or diarrhea. SH:  Social History   Substance Use Topics    Smoking status: Former Smoker     Packs/day: 1.00     Years: 30.00     Types: Cigarettes    Smokeless tobacco: Never Used    Alcohol use No      Comment: recoverin alcoholic         Medications/Allergies:  Current Outpatient Prescriptions on File Prior to Visit   Medication Sig Dispense Refill    cyanocobalamin (VITAMIN B-12) 100 mcg tablet Take 100 mcg by mouth daily.  amLODIPine (NORVASC) 5 mg tablet take 1 tablets by mouth once daily (Patient taking differently: 2.5 mg daily. take 1 tablets by mouth once daily) 90 Tab 1    albuterol (PROVENTIL HFA, VENTOLIN HFA, PROAIR HFA) 90 mcg/actuation inhaler Take 1 Puff by inhalation every six (6) hours as needed for Wheezing. 1 Inhaler 1    OM 3-E-Linol-ALA-Oleic-GLA-Lip (OMEGA 3-6-9) 40-60-10 mg-mg-unit cap Take  by mouth daily.  B.infantis-B.ani-B.long-B.bifi (PROBIOTIC 4X) 10-15 mg TbEC Take  by mouth daily.  lisinopril-hydroCHLOROthiazide (PRINZIDE, ZESTORETIC) 20-12.5 mg per tablet Take 2 Tabs by mouth daily. 180 Tab 3    plant stanol jerrell (CHOLEST OFF) 450 mg tab Take 1 Tab by mouth daily. 1 Tab 0    Glucosamine &Chondroit-MV-Min3 043-202-66-0.5 mg tab Take 1 Tab by mouth daily. 1 Tab 0    metoprolol succinate (TOPROL-XL) 100 mg tablet take 1 tablet by mouth once daily 90 Tab 3    omeprazole (PRILOSEC) 20 mg capsule take 1 capsule by mouth once daily 90 Cap 3    Cholecalciferol, Vitamin D3, (VITAMIN D3) 1,000 unit cap Take  by mouth.  rosuvastatin (CRESTOR) 40 mg tablet take 1 tablet by mouth at bedtime 90 Tab 3    multivitamin (ONE A DAY) tablet Take 1 Tab by mouth daily. No current facility-administered medications on file prior to visit.            Allergies   Allergen Reactions    Strattera [Atomoxetine] Other (comments)     Prostatitus    Sulfa (Sulfonamide Antibiotics) Hives and Itching    Venom-Wasp Swelling       Objective:  Visit Vitals    /79    Pulse 70    Temp 96.8 °F (36 °C) (Oral)    Resp 16    Ht 5' 10\" (1.778 m)    Wt 254 lb (115.2 kg)    BMI 36.45 kg/m2    Body mass index is 36.45 kg/(m^2). Constitutional: Well developed, nourished, no distress, alert   CV: S1, S2.  RRR. No murmurs/rubs. No edema. Pulm: No abnormalities on inspection. Clear to auscultation bilaterally. No wheezing/rhonchi. Normal effort. GI: Soft, nontender, nondistended. Normal active bowel sounds. No  masses on palpation. No hepatosplenomegaly.

## 2017-12-04 NOTE — MR AVS SNAPSHOT
Visit Information Date & Time Provider Department Dept. Phone Encounter #  
 12/4/2017  8:15 AM Bridgette HawthorneManasa 13 [id number] Follow-up Instructions Return in about 3 months (around 3/4/2018) for 30 minute slot with labs prior. Oanh Herman Upcoming Health Maintenance Date Due COLONOSCOPY 10/23/1980 Pneumococcal 19-64 Highest Risk (2 of 3 - PCV13) 10/4/2018 DTaP/Tdap/Td series (2 - Td) 10/4/2027 Allergies as of 12/4/2017  Review Complete On: 12/4/2017 By: Bridgette Hawthorne MD  
  
 Severity Noted Reaction Type Reactions Strattera [Atomoxetine]  06/20/2013    Other (comments) Prostatitus Sulfa (Sulfonamide Antibiotics)  06/20/2013    Hives, Itching Venom-wasp  07/29/2014    Swelling Current Immunizations  Reviewed on 10/4/2017 Name Date Influenza Vaccine 10/31/2014 Influenza Vaccine (Quad) PF 12/30/2015 Pneumococcal Polysaccharide (PPSV-23) 10/4/2017 Tdap 10/4/2017 Not reviewed this visit You Were Diagnosed With   
  
 Codes Comments Essential hypertension    -  Primary ICD-10-CM: I10 
ICD-9-CM: 401.9 Prediabetes     ICD-10-CM: R73.03 
ICD-9-CM: 790.29 Obesity (BMI 30-39. 9)     ICD-10-CM: E66.9 ICD-9-CM: 278.00 Anxiety     ICD-10-CM: F41.9 ICD-9-CM: 300.00 Cigarette nicotine dependence without complication     NIURKA-15-TF: F17.210 ICD-9-CM: 305.1 Alcoholism in remission [facility])     ICD-10-CM: Y64.16 ICD-9-CM: 303.93 Prostate cancer screening     ICD-10-CM: Z12.5 ICD-9-CM: V76.44 Vitals BP Pulse Temp Resp Height(growth percentile) Weight(growth percentile) 159/79 70 96.8 °F (36 °C) (Oral) 16 5' 10\" (1.778 m) 254 lb (115.2 kg) BMI Smoking Status 36.45 kg/m2 Former Smoker Vitals History BMI and BSA Data Body Mass Index Body Surface Area  
 36.45 kg/m 2 2.39 m 2 Preferred Pharmacy Pharmacy Name Phone [address] [place] [address] [address] [facility] [phone] Your Updated Medication List  
  
   
This list is accurate as of: 12/4/17  8:49 AM.  Always use your most recent med list.  
  
  
  
  
 albuterol 90 mcg/actuation inhaler Commonly known as:  PROVENTIL HFA, VENTOLIN HFA, PROAIR HFA Take 1 Puff by inhalation every six (6) hours as needed for Wheezing. amLODIPine 5 mg tablet Commonly known as:  NORVASC  
take 1 tablets by mouth once daily Glucosamine &Chondroit-MV-Min3 794-023-91-0.5 mg Tab Take 1 Tab by mouth daily. lisinopril-hydroCHLOROthiazide 20-12.5 mg per tablet Commonly known as:  Kimberli Munguialer Take 2 Tabs by mouth daily. metoprolol succinate 100 mg tablet Commonly known as:  TOPROL-XL  
take 1 tablet by mouth once daily  
  
 multivitamin tablet Commonly known as:  ONE A DAY Take 1 Tab by mouth daily. OMEGA 3-6-9 40-60-10 mg-mg-unit Cap Generic drug:  OM 3-E-Linol-ALA-Oleic-GLA-Lip Take  by mouth daily. omeprazole 20 mg capsule Commonly known as:  PRILOSEC  
take 1 capsule by mouth once daily  
  
 plant stanol jerrell 450 mg Tab Commonly known as:  CHOLEST OFF Take 1 Tab by mouth daily. PROBIOTIC 4X 10-15 mg Tbec Generic drug:  B.infantis-B.ani-B.long-B.bifi Take  by mouth daily. rosuvastatin 40 mg tablet Commonly known as:  CRESTOR  
take 1 tablet by mouth at bedtime VITAMIN B-12 100 mcg tablet Generic drug:  cyanocobalamin Take 100 mcg by mouth daily. VITAMIN D3 1,000 unit Cap Generic drug:  cholecalciferol Take  by mouth. We Performed the Following REFERRAL TO PSYCHIATRY [REF91 Custom] Comments:  
 Please evaluate patient for anxiety, alcoholism in remission, nicotine dependence. - pt requests Dr. Joao Michaels as he has seen him in the past.  
  
Follow-up Instructions Return in about 3 months (around 3/4/2018) for 30 minute slot with labs prior. Angel Silver To-Do List   
 12/04/2017 Lab:  CBC W/O DIFF   
  
 12/04/2017 Lab:  HEMOGLOBIN A1C WITH EAG   
  
 12/04/2017 Lab:  METABOLIC PANEL, BASIC   
  
 12/04/2017 Lab:  PSA SCREENING (SCREENING) Referral Information Referral ID Referred By Referred To  
  
 [id number] SYL Jefferson Not Available Visits Status Start Date End Date 1 New Request 12/4/17 12/4/18 If your referral has a status of pending review or denied, additional information will be sent to support the outcome of this decision. Patient Instructions Please call Dr. Kareem Hammond for an appointment. Address: [facility] [address] [facility], [address] Phone: [phone] [facility] SERVICES! Dear Marnie Stanford: Thank you for requesting a [employer] account. Our records indicate that you already have an active [employer] account. You can access your account anytime at [email or web address] Did you know that you can access your hospital and ER discharge instructions at any time in [employer]? You can also review all of your test results from your hospital stay or ER visit. Additional Information If you have questions, please visit the Frequently Asked Questions section of the [employer] website at [email or web address] Remember, [employer] is NOT to be used for urgent needs. For medical emergencies, dial 911. Now available from your iPhone and Android! Please provide this summary of care documentation to your next provider. Your primary care clinician is listed as AKSHAT Cabrera. If you have any questions after today's visit, please call [phone].

## 2018-01-02 RX ORDER — OMEPRAZOLE 20 MG/1
CAPSULE, DELAYED RELEASE ORAL
Qty: 90 CAP | Refills: 3 | Status: SHIPPED | OUTPATIENT
Start: 2018-01-02 | End: 2018-12-30 | Stop reason: SDUPTHER

## 2018-01-02 RX ORDER — VARENICLINE TARTRATE 1 MG/1
TABLET, FILM COATED ORAL
Qty: 20 TAB | Refills: 0 | Status: SHIPPED | OUTPATIENT
Start: 2018-01-02 | End: 2018-01-08 | Stop reason: SDUPTHER

## 2018-01-08 RX ORDER — VARENICLINE TARTRATE 1 MG/1
TABLET, FILM COATED ORAL
Qty: 60 TAB | Refills: 2 | Status: SHIPPED | OUTPATIENT
Start: 2018-01-08 | End: 2019-04-09

## 2018-01-29 RX ORDER — METOPROLOL SUCCINATE 100 MG/1
TABLET, EXTENDED RELEASE ORAL
Qty: 90 TAB | Refills: 3 | Status: SHIPPED | OUTPATIENT
Start: 2018-01-29 | End: 2019-01-30 | Stop reason: SDUPTHER

## 2018-02-06 ENCOUNTER — OFFICE VISIT (OUTPATIENT)
Dept: FAMILY MEDICINE CLINIC | Age: 58
End: 2018-02-06

## 2018-02-06 VITALS
HEART RATE: 73 BPM | DIASTOLIC BLOOD PRESSURE: 74 MMHG | BODY MASS INDEX: 36.19 KG/M2 | OXYGEN SATURATION: 94 % | HEIGHT: 70 IN | WEIGHT: 252.8 LBS | TEMPERATURE: 98.6 F | RESPIRATION RATE: 17 BRPM | SYSTOLIC BLOOD PRESSURE: 143 MMHG

## 2018-02-06 DIAGNOSIS — R05.9 COUGH IN ADULT PATIENT: ICD-10-CM

## 2018-02-06 DIAGNOSIS — J11.1: ICD-10-CM

## 2018-02-06 DIAGNOSIS — R52 BODY ACHES: ICD-10-CM

## 2018-02-06 DIAGNOSIS — J11.1 INFLUENZA: Primary | ICD-10-CM

## 2018-02-06 LAB
QUICKVUE INFLUENZA TEST: POSITIVE
S PYO AG THROAT QL: NEGATIVE
VALID INTERNAL CONTROL?: YES
VALID INTERNAL CONTROL?: YES

## 2018-02-06 RX ORDER — PROMETHAZINE HYDROCHLORIDE AND CODEINE PHOSPHATE 6.25; 1 MG/5ML; MG/5ML
5-10 SOLUTION ORAL
Qty: 200 ML | Refills: 1 | Status: SHIPPED | OUTPATIENT
Start: 2018-02-06 | End: 2018-03-26 | Stop reason: ALTCHOICE

## 2018-02-06 NOTE — LETTER
NOTIFICATION RETURN TO WORK / SCHOOL 
 
2/6/2018 11:12 AM 
 
Mr. Danielito Collier [address] [facility] [address] [phone] To Whom It May Concern: 
 
Danielito Collier is currently under the care of Linda Doan. He has been diagnosed with influenza A. He will return to work/school on: 2/12/18 If there are questions or concerns please have the patient contact our office. Sincerely, Ronald Flores NP

## 2018-02-06 NOTE — PROGRESS NOTES
1. Have you been to the ER, urgent care clinic since your last visit? Hospitalized since your last visit? No    2. Have you seen or consulted any other health care providers outside of the [address] [place] [address] since your last visit? Include any pap smears or colon screening.  No       Patient here today for flu like symptoms

## 2018-02-06 NOTE — PATIENT INSTRUCTIONS
Increase fluids of water or Gatorade/or equivalent  Gargle with warm salt water  Use humidifier on bi pap  Rest  Stay out of public areas  Albuterol inhaler as needed         Bronchitis: Care Instructions  Your Care Instructions    Bronchitis is inflammation of the bronchial tubes, which carry air to the lungs. The tubes swell and produce mucus, or phlegm. The mucus and inflamed bronchial tubes make you cough. You may have trouble breathing. Most cases of bronchitis are caused by viruses like those that cause colds. Antibiotics usually do not help and they may be harmful. Bronchitis usually develops rapidly and lasts about 2 to 3 weeks in otherwise healthy people. Follow-up care is a key part of your treatment and safety. Be sure to make and go to all appointments, and call your doctor if you are having problems. It's also a good idea to know your test results and keep a list of the medicines you take. How can you care for yourself at home? · Take all medicines exactly as prescribed. Call your doctor if you think you are having a problem with your medicine. · Get some extra rest.  · Take an over-the-counter pain medicine, such as acetaminophen (Tylenol), ibuprofen (Advil, Motrin), or naproxen (Aleve) to reduce fever and relieve body aches. Read and follow all instructions on the label. · Do not take two or more pain medicines at the same time unless the doctor told you to. Many pain medicines have acetaminophen, which is Tylenol. Too much acetaminophen (Tylenol) can be harmful. · Take an over-the-counter cough medicine that contains dextromethorphan to help quiet a dry, hacking cough so that you can sleep. Avoid cough medicines that have more than one active ingredient. Read and follow all instructions on the label. · Breathe moist air from a humidifier, hot shower, or sink filled with hot water. The heat and moisture will thin mucus so you can cough it out. · Do not smoke.  Smoking can make bronchitis worse. If you need help quitting, talk to your doctor about stop-smoking programs and medicines. These can increase your chances of quitting for good. When should you call for help? Call 911 anytime you think you may need emergency care. For example, call if:  ? · You have severe trouble breathing. ?Call your doctor now or seek immediate medical care if:  ? · You have new or worse trouble breathing. ? · You cough up dark brown or bloody mucus (sputum). ? · You have a new or higher fever. ? · You have a new rash. ? Watch closely for changes in your health, and be sure to contact your doctor if:  ? · You cough more deeply or more often, especially if you notice more mucus or a change in the color of your mucus. ? · You are not getting better as expected. Where can you learn more? Go to http://cristoAccelliongerman.info/. Enter H333 in the search box to learn more about \"Bronchitis: Care Instructions. \"  Current as of: May 12, 2017  Content Version: 11.4  © 5096-2786 "[employer]". Care instructions adapted under license by [employer] (which disclaims liability or warranty for this information). If you have questions about a medical condition or this instruction, always ask your healthcare professional. [address] any warranty or liability for your use of this information. Cough: Care Instructions  Your Care Instructions    A cough is your body's response to something that bothers your throat or airways. Many things can cause a cough. You might cough because of a cold or the flu, bronchitis, or asthma. Smoking, postnasal drip, allergies, and stomach acid that backs up into your throat also can cause coughs. A cough is a symptom, not a disease. Most coughs stop when the cause, such as a cold, goes away. You can take a few steps at home to cough less and feel better. Follow-up care is a key part of your treatment and safety.  Be sure to make and go to all appointments, and call your doctor if you are having problems. It's also a good idea to know your test results and keep a list of the medicines you take. How can you care for yourself at home? · Drink lots of water and other fluids. This helps thin the mucus and soothes a dry or sore throat. Honey or lemon juice in hot water or tea may ease a dry cough. · Take cough medicine as directed by your doctor. · Prop up your head on pillows to help you breathe and ease a dry cough. · Try cough drops to soothe a dry or sore throat. Cough drops don't stop a cough. Medicine-flavored cough drops are no better than candy-flavored drops or hard candy. · Do not smoke. Avoid secondhand smoke. If you need help quitting, talk to your doctor about stop-smoking programs and medicines. These can increase your chances of quitting for good. When should you call for help? Call 911 anytime you think you may need emergency care. For example, call if:  ? · You have severe trouble breathing. ?Call your doctor now or seek immediate medical care if:  ? · You cough up blood. ? · You have new or worse trouble breathing. ? · You have a new or higher fever. ? · You have a new rash. ? Watch closely for changes in your health, and be sure to contact your doctor if:  ? · You cough more deeply or more often, especially if you notice more mucus or a change in the color of your mucus. ? · You have new symptoms, such as a sore throat, an earache, or sinus pain. ? · You do not get better as expected. Where can you learn more? Go to http://cristo-german.info/. Enter D279 in the search box to learn more about \"Cough: Care Instructions. \"  Current as of: May 12, 2017  Content Version: 11.4  © 5613-6410 [employer]. Care instructions adapted under license by [employer] (which disclaims liability or warranty for this information).  If you have questions about a medical condition or this instruction, always ask your healthcare professional. [place] [address] any warranty or liability for your use of this information. Influenza (Flu): Care Instructions  Your Care Instructions    Influenza (flu) is an infection in the lungs and breathing passages. It is caused by the influenza virus. There are different strains, or types, of the flu virus from year to year. Unlike the common cold, the flu comes on suddenly and the symptoms, such as a cough, congestion, fever, chills, fatigue, aches, and pains, are more severe. These symptoms may last up to 10 days. Although the flu can make you feel very sick, it usually doesn't cause serious health problems. Home treatment is usually all you need for flu symptoms. But your doctor may prescribe antiviral medicine to prevent other health problems, such as pneumonia, from developing. Older people and those who have a long-term health condition, such as lung disease, are most at risk for having pneumonia or other health problems. Follow-up care is a key part of your treatment and safety. Be sure to make and go to all appointments, and call your doctor if you are having problems. It's also a good idea to know your test results and keep a list of the medicines you take. How can you care for yourself at home? · Get plenty of rest.  · Drink plenty of fluids, enough so that your urine is light yellow or clear like water. If you have kidney, heart, or liver disease and have to limit fluids, talk with your doctor before you increase the amount of fluids you drink. · Take an over-the-counter pain medicine if needed, such as acetaminophen (Tylenol), ibuprofen (Advil, Motrin), or naproxen (Aleve), to relieve fever, headache, and muscle aches. Read and follow all instructions on the label. No one younger than 20 should take aspirin. It has been linked to Reye syndrome, a serious illness. · Do not smoke. Smoking can make the flu worse.  If you need help quitting, talk to your doctor about stop-smoking programs and medicines. These can increase your chances of quitting for good. · Breathe moist air from a hot shower or from a sink filled with hot water to help clear a stuffy nose. · Before you use cough and cold medicines, check the label. These medicines may not be safe for young children or for people with certain health problems. · If the skin around your nose and lips becomes sore, put some petroleum jelly on the area. · To ease coughing:  ¨ Drink fluids to soothe a scratchy throat. ¨ Suck on cough drops or plain hard candy. ¨ Take an over-the-counter cough medicine that contains dextromethorphan to help you get some sleep. Read and follow all instructions on the label. ¨ Raise your head at night with an extra pillow. This may help you rest if coughing keeps you awake. · Take any prescribed medicine exactly as directed. Call your doctor if you think you are having a problem with your medicine. To avoid spreading the flu  · Wash your hands regularly, and keep your hands away from your face. · Stay home from school, work, and other public places until you are feeling better and your fever has been gone for at least 24 hours. The fever needs to have gone away on its own without the help of medicine. · Ask people living with you to talk to their doctors about preventing the flu. They may get antiviral medicine to keep from getting the flu from you. · To prevent the flu in the future, get a flu vaccine every fall. Encourage people living with you to get the vaccine. · Cover your mouth when you cough or sneeze. When should you call for help? Call 911 anytime you think you may need emergency care. For example, call if:  ? · You have severe trouble breathing. ?Call your doctor now or seek immediate medical care if:  ? · You have new or worse trouble breathing. ? · You seem to be getting much sicker. ? · You feel very sleepy or confused. ? · You have a new or higher fever. ? · You get a new rash. ? Watch closely for changes in your health, and be sure to contact your doctor if:  ? · You begin to get better and then get worse. ? · You are not getting better after 1 week. Where can you learn more? Go to http://cristo-german.info/. Enter Z252 in the search box to learn more about \"Influenza (Flu): Care Instructions. \"  Current as of: May 12, 2017  Content Version: 11.4  © 2292-5108 [employer]. Care instructions adapted under license by [employer] (which disclaims liability or warranty for this information). If you have questions about a medical condition or this instruction, always ask your healthcare professional. [address] any warranty or liability for your use of this information.
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
## 2018-02-06 NOTE — MR AVS SNAPSHOT
Melvin Faustin [place] [address] [place] [address] 
[phone] Patient: Johnna Ramirez MRN: [id number] :1960 Visit Information Date & Time Provider Department Dept. Phone Encounter #  
 2018 10:30 AM Nora Cardenas, [address] [facility] [phone] [id number] Your Appointments 3/2/2018  8:15 AM  
LAB with [facility] [address] ([facility]) Appt Note: labs [facility] [place]  
  
   
 [address] [address]  
  
    
 3/9/2018  8:00 AM  
Follow Up with MD Linda Rucker [facility]) Appt Note: 3 month fu appt [facility] [place]  
  
   
 [address] Upcoming Health Maintenance Date Due Pneumococcal 19-64 Highest Risk (2 of 3 - PCV13) 10/4/2018 COLONOSCOPY 2019 DTaP/Tdap/Td series (2 - Td) 10/4/2027 Allergies as of 2018  Review Complete On: 2018 By: Nora Cardenas NP Severity Noted Reaction Type Reactions Strattera [Atomoxetine]  2013    Other (comments) Prostatitus Sulfa (Sulfonamide Antibiotics)  2013    Hives, Itching Venom-wasp  2014    Swelling Current Immunizations  Reviewed on 10/4/2017 Name Date Influenza Vaccine 10/31/2014 Influenza Vaccine (Quad) PF 2015 Pneumococcal Polysaccharide (PPSV-23) 10/4/2017 Tdap 10/4/2017 Not reviewed this visit You Were Diagnosed With   
  
 Codes Comments Influenza    -  Primary ICD-10-CM: J11.1 ICD-9-CM: 084.1 Body aches     ICD-10-CM: R52 ICD-9-CM: 780.96 Cough in adult patient     ICD-10-CM: R05 ICD-9-CM: 591. 2 Vitals BP Pulse Temp Resp Height(growth percentile) Weight(growth percentile) 143/74 (BP 1 Location: Left arm, BP Patient Position: Sitting) 73 98.6 °F (37 °C) (Oral) 17 5' 10\" (1.778 m) 252 lb 12.8 oz (114.7 kg) SpO2 BMI Smoking Status 94% 36.27 kg/m2 Former Smoker BMI and BSA Data Body Mass Index Body Surface Area  
 36.27 kg/m 2 2.38 m 2 Preferred Pharmacy Pharmacy Name Phone [address] [facility], [address] [place] [address] [address] [facility] [phone] Your Updated Medication List  
  
   
This list is accurate as of: 2/6/18 11:23 AM.  Always use your most recent med list.  
  
  
  
  
 albuterol 90 mcg/actuation inhaler Commonly known as:  PROVENTIL HFA, VENTOLIN HFA, PROAIR HFA Take 1 Puff by inhalation every six (6) hours as needed for Wheezing. amLODIPine 5 mg tablet Commonly known as:  NORVASC  
take 1 tablets by mouth once daily Glucosamine &Chondroit-MV-Min3 388-178-95-0.5 mg Tab Take 1 Tab by mouth daily. lisinopril-hydroCHLOROthiazide 20-12.5 mg per tablet Commonly known as:  Darylene Stapler Take 2 Tabs by mouth daily. metoprolol succinate 100 mg tablet Commonly known as:  TOPROL-XL  
take 1 tablet by mouth once daily  
  
 multivitamin tablet Commonly known as:  ONE A DAY Take 1 Tab by mouth daily. OMEGA 3-6-9 40-60-10 mg-mg-unit Cap Generic drug:  OM 3-E-Linol-ALA-Oleic-GLA-Lip Take  by mouth daily. omeprazole 20 mg capsule Commonly known as:  PRILOSEC  
take 1 capsule by mouth once daily  
  
 plant stanol jerrell 450 mg Tab Commonly known as:  CHOLEST OFF Take 1 Tab by mouth daily. PROBIOTIC 4X 10-15 mg Tbec Generic drug:  B.infantis-B.ani-B.long-B.bifi Take  by mouth daily. promethazine-codeine 6.25-10 mg/5 mL syrup Commonly known as:  PHENERGAN-CODEINE Take 5-10 mL by mouth four (4) times daily as needed for Cough. Max Daily Amount: 40 mL. rosuvastatin 40 mg tablet Commonly known as:  CRESTOR  
 take 1 tablet by mouth at bedtime  
  
 varenicline 1 mg tablet Commonly known as:  Josee Jose Daniel TAKE 1 TABLET BY MOUTH TWICE A DAY  
  
 VITAMIN B-12 100 mcg tablet Generic drug:  cyanocobalamin Take 100 mcg by mouth daily. VITAMIN D3 1,000 unit Cap Generic drug:  cholecalciferol Take  by mouth. Prescriptions Printed Refills  
 promethazine-codeine (PHENERGAN-CODEINE) 6.25-10 mg/5 mL syrup 1 Sig: Take 5-10 mL by mouth four (4) times daily as needed for Cough. Max Daily Amount: 40 mL. Class: Print Route: Oral  
  
We Performed the Following AMB POC RAPID INFLUENZA TEST [96403 CPT(R)] Patient Instructions Increase fluids of water or Gatorade/or equivalent Gargle with warm salt water Use humidifier on bi pap Rest 
Stay out of public areas Albuterol inhaler as needed Bronchitis: Care Instructions Your Care Instructions Bronchitis is inflammation of the bronchial tubes, which carry air to the lungs. The tubes swell and produce mucus, or phlegm. The mucus and inflamed bronchial tubes make you cough. You may have trouble breathing. Most cases of bronchitis are caused by viruses like those that cause colds. Antibiotics usually do not help and they may be harmful. Bronchitis usually develops rapidly and lasts about 2 to 3 weeks in otherwise healthy people. Follow-up care is a key part of your treatment and safety. Be sure to make and go to all appointments, and call your doctor if you are having problems. It's also a good idea to know your test results and keep a list of the medicines you take. How can you care for yourself at home? · Take all medicines exactly as prescribed. Call your doctor if you think you are having a problem with your medicine.  
· Get some extra rest. 
· Take an over-the-counter pain medicine, such as acetaminophen (Tylenol), ibuprofen (Advil, Motrin), or naproxen (Aleve) to reduce fever and relieve body aches. Read and follow all instructions on the label. · Do not take two or more pain medicines at the same time unless the doctor told you to. Many pain medicines have acetaminophen, which is Tylenol. Too much acetaminophen (Tylenol) can be harmful. · Take an over-the-counter cough medicine that contains dextromethorphan to help quiet a dry, hacking cough so that you can sleep. Avoid cough medicines that have more than one active ingredient. Read and follow all instructions on the label. · Breathe moist air from a humidifier, hot shower, or sink filled with hot water. The heat and moisture will thin mucus so you can cough it out. · Do not smoke. Smoking can make bronchitis worse. If you need help quitting, talk to your doctor about stop-smoking programs and medicines. These can increase your chances of quitting for good. When should you call for help? Call 911 anytime you think you may need emergency care. For example, call if: 
? · You have severe trouble breathing. ?Call your doctor now or seek immediate medical care if: 
? · You have new or worse trouble breathing. ? · You cough up dark brown or bloody mucus (sputum). ? · You have a new or higher fever. ? · You have a new rash. ? Watch closely for changes in your health, and be sure to contact your doctor if: 
? · You cough more deeply or more often, especially if you notice more mucus or a change in the color of your mucus. ? · You are not getting better as expected. Where can you learn more? Go to http://cristo-german.info/. Enter H333 in the search box to learn more about \"Bronchitis: Care Instructions. \" Current as of: May 12, 2017 Content Version: 11.4 © 8447-2430 [employer]. Care instructions adapted under license by [employer] (which disclaims liability or warranty for this information).  If you have questions about a medical condition or this instruction, always ask your healthcare professional. [place] [address] any warranty or liability for your use of this information. Cough: Care Instructions Your Care Instructions A cough is your body's response to something that bothers your throat or airways. Many things can cause a cough. You might cough because of a cold or the flu, bronchitis, or asthma. Smoking, postnasal drip, allergies, and stomach acid that backs up into your throat also can cause coughs. A cough is a symptom, not a disease. Most coughs stop when the cause, such as a cold, goes away. You can take a few steps at home to cough less and feel better. Follow-up care is a key part of your treatment and safety. Be sure to make and go to all appointments, and call your doctor if you are having problems. It's also a good idea to know your test results and keep a list of the medicines you take. How can you care for yourself at home? · Drink lots of water and other fluids. This helps thin the mucus and soothes a dry or sore throat. Honey or lemon juice in hot water or tea may ease a dry cough. · Take cough medicine as directed by your doctor. · Prop up your head on pillows to help you breathe and ease a dry cough. · Try cough drops to soothe a dry or sore throat. Cough drops don't stop a cough. Medicine-flavored cough drops are no better than candy-flavored drops or hard candy. · Do not smoke. Avoid secondhand smoke. If you need help quitting, talk to your doctor about stop-smoking programs and medicines. These can increase your chances of quitting for good. When should you call for help? Call 911 anytime you think you may need emergency care. For example, call if: 
? · You have severe trouble breathing. ?Call your doctor now or seek immediate medical care if: 
? · You cough up blood. ? · You have new or worse trouble breathing. ? · You have a new or higher fever. ? · You have a new rash. ?Watch closely for changes in your health, and be sure to contact your doctor if: 
? · You cough more deeply or more often, especially if you notice more mucus or a change in the color of your mucus. ? · You have new symptoms, such as a sore throat, an earache, or sinus pain. ? · You do not get better as expected. Where can you learn more? Go to http://cristo-german.info/. Enter D279 in the search box to learn more about \"Cough: Care Instructions. \" Current as of: May 12, 2017 Content Version: 11.4 © 9785-2699 [employer]. Care instructions adapted under license by [employer] (which disclaims liability or warranty for this information). If you have questions about a medical condition or this instruction, always ask your healthcare professional. [address] any warranty or liability for your use of this information. Influenza (Flu): Care Instructions Your Care Instructions Influenza (flu) is an infection in the lungs and breathing passages. It is caused by the influenza virus. There are different strains, or types, of the flu virus from year to year. Unlike the common cold, the flu comes on suddenly and the symptoms, such as a cough, congestion, fever, chills, fatigue, aches, and pains, are more severe. These symptoms may last up to 10 days. Although the flu can make you feel very sick, it usually doesn't cause serious health problems. Home treatment is usually all you need for flu symptoms. But your doctor may prescribe antiviral medicine to prevent other health problems, such as pneumonia, from developing. Older people and those who have a long-term health condition, such as lung disease, are most at risk for having pneumonia or other health problems. Follow-up care is a key part of your treatment and safety.  Be sure to make and go to all appointments, and call your doctor if you are having problems. It's also a good idea to know your test results and keep a list of the medicines you take. How can you care for yourself at home? · Get plenty of rest. 
· Drink plenty of fluids, enough so that your urine is light yellow or clear like water. If you have kidney, heart, or liver disease and have to limit fluids, talk with your doctor before you increase the amount of fluids you drink. · Take an over-the-counter pain medicine if needed, such as acetaminophen (Tylenol), ibuprofen (Advil, Motrin), or naproxen (Aleve), to relieve fever, headache, and muscle aches. Read and follow all instructions on the label. No one younger than 20 should take aspirin. It has been linked to Reye syndrome, a serious illness. · Do not smoke. Smoking can make the flu worse. If you need help quitting, talk to your doctor about stop-smoking programs and medicines. These can increase your chances of quitting for good. · Breathe moist air from a hot shower or from a sink filled with hot water to help clear a stuffy nose. · Before you use cough and cold medicines, check the label. These medicines may not be safe for young children or for people with certain health problems. · If the skin around your nose and lips becomes sore, put some petroleum jelly on the area. · To ease coughing: ¨ Drink fluids to soothe a scratchy throat. ¨ Suck on cough drops or plain hard candy. ¨ Take an over-the-counter cough medicine that contains dextromethorphan to help you get some sleep. Read and follow all instructions on the label. ¨ Raise your head at night with an extra pillow. This may help you rest if coughing keeps you awake. · Take any prescribed medicine exactly as directed. Call your doctor if you think you are having a problem with your medicine. To avoid spreading the flu · Wash your hands regularly, and keep your hands away from your face.  
· Stay home from school, work, and other public places until you are feeling better and your fever has been gone for at least 24 hours. The fever needs to have gone away on its own without the help of medicine. · Ask people living with you to talk to their doctors about preventing the flu. They may get antiviral medicine to keep from getting the flu from you. · To prevent the flu in the future, get a flu vaccine every fall. Encourage people living with you to get the vaccine. · Cover your mouth when you cough or sneeze. When should you call for help? Call 911 anytime you think you may need emergency care. For example, call if: 
? · You have severe trouble breathing. ?Call your doctor now or seek immediate medical care if: 
? · You have new or worse trouble breathing. ? · You seem to be getting much sicker. ? · You feel very sleepy or confused. ? · You have a new or higher fever. ? · You get a new rash. ? Watch closely for changes in your health, and be sure to contact your doctor if: 
? · You begin to get better and then get worse. ? · You are not getting better after 1 week. Where can you learn more? Go to http://cristo-german.info/. Enter G862 in the search box to learn more about \"Influenza (Flu): Care Instructions. \" Current as of: May 12, 2017 Content Version: 11.4 © 9551-6579 [employer]. Care instructions adapted under license by [employer] (which disclaims liability or warranty for this information). If you have questions about a medical condition or this instruction, always ask your healthcare professional. [place] [address] any warranty or liability for your use of this information. Introducing [facility]! Dear Caro Loja: Thank you for requesting a [employer] account. Our records indicate that you already have an active [employer] account. You can access your account anytime at [email or web address]://[employer]. [employer] Did you know that you can access your hospital and ER discharge instructions at any time in [employer]? You can also review all of your test results from your hospital stay or ER visit. Additional Information If you have questions, please visit the Frequently Asked Questions section of the [employer] website at [email or web address] Remember, [employer] is NOT to be used for urgent needs. For medical emergencies, dial 911. Now available from your iPhone and Android! Please provide this summary of care documentation to your next provider. Your primary care clinician is listed as AKSHAT Cabrera. If you have any questions after today's visit, please call [phone].

## 2018-03-03 LAB
ANION GAP SERPL CALC-SCNC: 18 MMOL/L
AVG GLU, 10930: 124 MG/DL (ref 91–123)
BUN SERPL-MCNC: 7 MG/DL (ref 6–22)
CALCIUM SERPL-MCNC: 9.3 MG/DL (ref 8.4–10.4)
CHLORIDE SERPL-SCNC: 98 MMOL/L (ref 98–110)
CO2 SERPL-SCNC: 25 MMOL/L (ref 20–32)
CREAT SERPL-MCNC: 0.7 MG/DL (ref 0.5–1.2)
ERYTHROCYTE [DISTWIDTH] IN BLOOD BY AUTOMATED COUNT: 14.9 % (ref 10–16)
GFRAA, 66117: >60
GFRNA, 66118: >60
GLUCOSE SERPL-MCNC: 104 MG/DL (ref 70–99)
HBA1C MFR BLD HPLC: 6 % (ref 4.8–5.9)
HCT VFR BLD AUTO: 41.8 % (ref 39.3–51.6)
HGB BLD-MCNC: 13.4 G/DL (ref 13.1–17.2)
MCH RBC QN AUTO: 31 PG (ref 26–34)
MCHC RBC AUTO-ENTMCNC: 32 G/DL (ref 32–36)
MCV RBC AUTO: 98 FL (ref 80–95)
PLATELET # BLD AUTO: 294 K/UL (ref 140–440)
PMV BLD AUTO: 10.6 FL (ref 6–10.8)
POTASSIUM SERPL-SCNC: 4.3 MMOL/L (ref 3.5–5.5)
PSA SERPL-MCNC: 0.85 NG/ML
RBC # BLD AUTO: 4.28 M/UL (ref 3.8–5.8)
SODIUM SERPL-SCNC: 141 MMOL/L (ref 133–145)
WBC # BLD AUTO: 6.3 K/UL (ref 4–11)

## 2018-03-26 ENCOUNTER — OFFICE VISIT (OUTPATIENT)
Dept: FAMILY MEDICINE CLINIC | Age: 58
End: 2018-03-26

## 2018-03-26 VITALS
HEART RATE: 61 BPM | RESPIRATION RATE: 16 BRPM | BODY MASS INDEX: 35.82 KG/M2 | TEMPERATURE: 96.8 F | HEIGHT: 70 IN | SYSTOLIC BLOOD PRESSURE: 143 MMHG | WEIGHT: 250.2 LBS | DIASTOLIC BLOOD PRESSURE: 75 MMHG

## 2018-03-26 DIAGNOSIS — F10.21 ALCOHOLISM IN REMISSION (HCC): ICD-10-CM

## 2018-03-26 DIAGNOSIS — F17.210 CIGARETTE NICOTINE DEPENDENCE WITHOUT COMPLICATION: ICD-10-CM

## 2018-03-26 DIAGNOSIS — I10 ESSENTIAL HYPERTENSION: ICD-10-CM

## 2018-03-26 DIAGNOSIS — E66.01 SEVERE OBESITY (BMI 35.0-39.9) WITH COMORBIDITY (HCC): Primary | ICD-10-CM

## 2018-03-26 DIAGNOSIS — R73.03 PREDIABETES: ICD-10-CM

## 2018-03-26 PROBLEM — F10.20 ALCOHOLIC (HCC): Status: RESOLVED | Noted: 2018-03-26 | Resolved: 2018-03-26

## 2018-03-26 NOTE — PROGRESS NOTES
1. Have you been to the ER, urgent care clinic since your last visit? Hospitalized since your last visit? No.     2. Have you seen or consulted any other health care providers outside of the [address] [place] [address] since your last visit? Include any pap smears or colon screening.  No.     Chief Complaint   Patient presents with    Follow Up Chronic Condition    Hypertension    Nicotine Dependence    Results     labs

## 2018-03-26 NOTE — MR AVS SNAPSHOT
Melvin Faustin Lima [address] [facility] [facility] 83 [id number] 
[phone] Patient: Cris Gonzales MRN: [id number] :1960 Visit Information Date & Time Provider Department Dept. Phone Encounter #  
 3/26/2018  8:15 AM Tadeo Hughes, [address] [place] [address] [phone] [id number] Upcoming Health Maintenance Date Due Pneumococcal 19-64 Highest Risk (2 of 3 - PCV13) 10/4/2018 COLONOSCOPY 2019 DTaP/Tdap/Td series (2 - Td) 10/4/2027 Allergies as of 3/26/2018  Review Complete On: 3/26/2018 By: Tadeo Hughes MD  
  
 Severity Noted Reaction Type Reactions Strattera [Atomoxetine]  2013    Other (comments) Prostatitus Sulfa (Sulfonamide Antibiotics)  2013    Hives, Itching Venom-wasp  2014    Swelling Current Immunizations  Reviewed on 10/4/2017 Name Date Influenza Vaccine 10/31/2014 Influenza Vaccine (Quad) PF 2015 Pneumococcal Polysaccharide (PPSV-23) 10/4/2017 Tdap 10/4/2017 Not reviewed this visit You Were Diagnosed With   
  
 Codes Comments Severe obesity (BMI 35.0-39. 9) with comorbidity ([facility] [address]    -  Primary ICD-10-CM: E66.01 
ICD-9-CM: 278.01 Essential hypertension     ICD-10-CM: I10 
ICD-9-CM: 401.9 Prediabetes     ICD-10-CM: R73.03 
ICD-9-CM: 790.29 Vitals BP Pulse Temp Resp Height(growth percentile) Weight(growth percentile) 143/75 61 96.8 °F (36 °C) (Oral) 16 5' 10\" (1.778 m) 250 lb 3.2 oz (113.5 kg) BMI Smoking Status 35.9 kg/m2 Former Smoker Vitals History BMI and BSA Data Body Mass Index Body Surface Area 35.9 kg/m 2 2.37 m 2 Preferred Pharmacy Pharmacy Name Phone [facility] [address], [address] [phone] Your Updated Medication List  
  
   
This list is accurate as of 3/26/18  9:01 AM.  Always use your most recent med list.  
  
  
  
  
 albuterol 90 mcg/actuation inhaler Commonly known as:  PROVENTIL HFA, VENTOLIN HFA, PROAIR HFA Take 1 Puff by inhalation every six (6) hours as needed for Wheezing. amLODIPine 5 mg tablet Commonly known as:  NORVASC  
take 1 tablets by mouth once daily Glucosamine &Chondroit-MV-Min3 392-280-47-0.5 mg Tab Take 1 Tab by mouth daily. lisinopril-hydroCHLOROthiazide 20-12.5 mg per tablet Commonly known as:  Darylene Stapler Take 2 Tabs by mouth daily. metoprolol succinate 100 mg tablet Commonly known as:  TOPROL-XL  
take 1 tablet by mouth once daily  
  
 multivitamin tablet Commonly known as:  ONE A DAY Take 1 Tab by mouth daily. OMEGA 3-6-9 40-60-10 mg-mg-unit Cap Generic drug:  OM 3-E-Linol-ALA-Oleic-GLA-Lip Take  by mouth daily. omeprazole 20 mg capsule Commonly known as:  PRILOSEC  
take 1 capsule by mouth once daily  
  
 plant stanol jerrell 450 mg Tab Commonly known as:  CHOLEST OFF Take 1 Tab by mouth daily. PROBIOTIC 4X 10-15 mg Tbec Generic drug:  B.infantis-B.ani-B.long-B.bifi Take  by mouth daily. rosuvastatin 40 mg tablet Commonly known as:  CRESTOR  
take 1 tablet by mouth at bedtime  
  
 varenicline 1 mg tablet Commonly known as:  Catalino Spitz TAKE 1 TABLET BY MOUTH TWICE A DAY  
  
 VITAMIN B-12 100 mcg tablet Generic drug:  cyanocobalamin Take 100 mcg by mouth daily. VITAMIN D3 1,000 unit Cap Generic drug:  cholecalciferol Take  by mouth. We Performed the Following REFERRAL TO WEIGHT LOSS [KVL737 Custom] Referral Information Referral ID Referred By Referred To  
  
 [id number] Robbie ALLEN [address], [address] [facility] [address] Phone: [phone] Fax: [phone] Visits Status Start Date End Date 1 New Request 3/26/18 3/26/19 If your referral has a status of pending review or denied, additional information will be sent to support the outcome of this decision. Patient Instructions Body Mass Index: Care Instructions Your Care Instructions Body mass index (BMI) can help you see if your weight is raising your risk for health problems. It uses a formula to compare how much you weigh with how tall you are. · A BMI lower than 18.5 is considered underweight. · A BMI between 18.5 and 24.9 is considered healthy. · A BMI between 25 and 29.9 is considered overweight. A BMI of 30 or higher is considered obese. If your BMI is in the normal range, it means that you have a lower risk for weight-related health problems. If your BMI is in the overweight or obese range, you may be at increased risk for weight-related health problems, such as high blood pressure, heart disease, stroke, arthritis or joint pain, and diabetes. If your BMI is in the underweight range, you may be at increased risk for health problems such as fatigue, lower protection (immunity) against illness, muscle loss, bone loss, hair loss, and hormone problems. BMI is just one measure of your risk for weight-related health problems. You may be at higher risk for health problems if you are not active, you eat an unhealthy diet, or you drink too much alcohol or use tobacco products. Follow-up care is a key part of your treatment and safety. Be sure to make and go to all appointments, and call your doctor if you are having problems. It's also a good idea to know your test results and keep a list of the medicines you take. How can you care for yourself at home? · Practice healthy eating habits. This includes eating plenty of fruits, vegetables, whole grains, lean protein, and low-fat dairy. · If your doctor recommends it, get more exercise. Walking is a good choice. Bit by bit, increase the amount you walk every day.  Try for at least 30 minutes on most days of the week. · Do not smoke. Smoking can increase your risk for health problems. If you need help quitting, talk to your doctor about stop-smoking programs and medicines. These can increase your chances of quitting for good. · Limit alcohol to 2 drinks a day for men and 1 drink a day for women. Too much alcohol can cause health problems. If you have a BMI higher than 25 · Your doctor may do other tests to check your risk for weight-related health problems. This may include measuring the distance around your waist. A waist measurement of more than 40 inches in men or 35 inches in women can increase the risk of weight-related health problems. · Talk with your doctor about steps you can take to stay healthy or improve your health. You may need to make lifestyle changes to lose weight and stay healthy, such as changing your diet and getting regular exercise. If you have a BMI lower than 18.5 · Your doctor may do other tests to check your risk for health problems. · Talk with your doctor about steps you can take to stay healthy or improve your health. You may need to make lifestyle changes to gain or maintain weight and stay healthy, such as getting more healthy foods in your diet and doing exercises to build muscle. Where can you learn more? Go to http://cristo-german.info/. Enter S176 in the search box to learn more about \"Body Mass Index: Care Instructions. \" Current as of: October 13, 2016 Content Version: 11.4 © 1276-3374 Healthwise, Incorporated. Care instructions adapted under license by [employer] (which disclaims liability or warranty for this information). If you have questions about a medical condition or this instruction, always ask your healthcare professional. [address] any warranty or liability for your use of this information. Introducing [facility]! Dear Osmel Rogers: Thank you for requesting a FeedBurner account. Our records indicate that you already have an active FeedBurner account. You can access your account anytime at [email or web address]. [facility]/[employer] Did you know that you can access your hospital and ER discharge instructions at any time in FeedBurner? You can also review all of your test results from your hospital stay or ER visit. Additional Information If you have questions, please visit the Frequently Asked Questions section of the FeedBurner website at [email or web address]. [facility]/[employer]/. Remember, FeedBurner is NOT to be used for urgent needs. For medical emergencies, dial 911. Now available from your iPhone and Android! Please provide this summary of care documentation to your next provider. Your primary care clinician is listed as AKSHAT Cabrera. If you have any questions after today's visit, please call [phone].

## 2018-03-26 NOTE — PATIENT INSTRUCTIONS
Body Mass Index: Care Instructions  Your Care Instructions    Body mass index (BMI) can help you see if your weight is raising your risk for health problems. It uses a formula to compare how much you weigh with how tall you are. · A BMI lower than 18.5 is considered underweight. · A BMI between 18.5 and 24.9 is considered healthy. · A BMI between 25 and 29.9 is considered overweight. A BMI of 30 or higher is considered obese. If your BMI is in the normal range, it means that you have a lower risk for weight-related health problems. If your BMI is in the overweight or obese range, you may be at increased risk for weight-related health problems, such as high blood pressure, heart disease, stroke, arthritis or joint pain, and diabetes. If your BMI is in the underweight range, you may be at increased risk for health problems such as fatigue, lower protection (immunity) against illness, muscle loss, bone loss, hair loss, and hormone problems. BMI is just one measure of your risk for weight-related health problems. You may be at higher risk for health problems if you are not active, you eat an unhealthy diet, or you drink too much alcohol or use tobacco products. Follow-up care is a key part of your treatment and safety. Be sure to make and go to all appointments, and call your doctor if you are having problems. It's also a good idea to know your test results and keep a list of the medicines you take. How can you care for yourself at home? · Practice healthy eating habits. This includes eating plenty of fruits, vegetables, whole grains, lean protein, and low-fat dairy. · If your doctor recommends it, get more exercise. Walking is a good choice. Bit by bit, increase the amount you walk every day. Try for at least 30 minutes on most days of the week. · Do not smoke. Smoking can increase your risk for health problems. If you need help quitting, talk to your doctor about stop-smoking programs and medicines. These can increase your chances of quitting for good. · Limit alcohol to 2 drinks a day for men and 1 drink a day for women. Too much alcohol can cause health problems. If you have a BMI higher than 25  · Your doctor may do other tests to check your risk for weight-related health problems. This may include measuring the distance around your waist. A waist measurement of more than 40 inches in men or 35 inches in women can increase the risk of weight-related health problems. · Talk with your doctor about steps you can take to stay healthy or improve your health. You may need to make lifestyle changes to lose weight and stay healthy, such as changing your diet and getting regular exercise. If you have a BMI lower than 18.5  · Your doctor may do other tests to check your risk for health problems. · Talk with your doctor about steps you can take to stay healthy or improve your health. You may need to make lifestyle changes to gain or maintain weight and stay healthy, such as getting more healthy foods in your diet and doing exercises to build muscle. Where can you learn more? Go to http://cristo-german.info/. Enter S176 in the search box to learn more about \"Body Mass Index: Care Instructions. \"  Current as of: October 13, 2016  Content Version: 11.4  © 7879-3444 Healthwise, Incorporated. Care instructions adapted under license by [employer] (which disclaims liability or warranty for this information). If you have questions about a medical condition or this instruction, always ask your healthcare professional. [address] any warranty or liability for your use of this information.

## 2018-03-26 NOTE — PROGRESS NOTES
Dee Guido is a 56 y.o. male and presents with Follow Up Chronic Condition; Hypertension; Nicotine Dependence; and Results (labs)       Subjective: Tobacco abuse- still remains abstinent- doing well overall. Obesity- changed diet to pescatarian. Trying to   Prediabetes- walking daily but increased fruit intake.   htn- taking all meds. No cp or sob. Avoids high sodium foods. Alcoholism- in remission. Assessment/Plan:    Tobacco abuse- continue Chantix. Encouraged slow taper of this. Obesity- severe with comorbidities- pt interested in metabolic weight loss program. Caloric restriction encouraged to 1 lb/week wt loss. Prediabetes- discussed increased hgb a1c and may  Need to add metformin if not improved in 3 months.   htn- as pt will enroll in wt loss program will hold on adding meds  Alcohol-continues to be in remission. RTC in 3 months  Orders Placed This Encounter    REFERRAL TO WEIGHT LOSS     Referral Priority:   Routine     Referral Type:   Consultation     Referral Reason:   Specialty Services Required     Referral Location:   [facility]     Referred to Provider:   Tish Cabezas DO     Diagnoses and all orders for this visit:    1. Severe obesity (BMI 35.0-39. 9) with comorbidity ([facility] [address]  -     REFERRAL TO WEIGHT LOSS    2. Essential hypertension    3. Prediabetes    4. [facility]    5. Cigarette nicotine dependence without complication        ROS:  Negative except as mentioned above  Cardiac- no chest pain or palpitations  Pulmonary- no sob or wheezes  GI- no n/v or diarrhea.       SH:  Social History   Substance Use Topics    Smoking status: Former Smoker     Packs/day: 1.00     Years: 30.00     Types: Cigarettes    Smokeless tobacco: Never Used    Alcohol use No      Comment: recoverin alcoholic         Medications/Allergies:  Current Outpatient Prescriptions on File Prior to Visit   Medication Sig Dispense Refill    metoprolol succinate (TOPROL-XL) 100 mg tablet take 1 tablet by mouth once daily 90 Tab 3    varenicline (CHANTIX) 1 mg tablet TAKE 1 TABLET BY MOUTH TWICE A DAY 60 Tab 2    omeprazole (PRILOSEC) 20 mg capsule take 1 capsule by mouth once daily 90 Cap 3    cyanocobalamin (VITAMIN B-12) 100 mcg tablet Take 100 mcg by mouth daily.  amLODIPine (NORVASC) 5 mg tablet take 1 tablets by mouth once daily (Patient taking differently: 2.5 mg daily. take 1 tablets by mouth once daily) 90 Tab 1    albuterol (PROVENTIL HFA, VENTOLIN HFA, PROAIR HFA) 90 mcg/actuation inhaler Take 1 Puff by inhalation every six (6) hours as needed for Wheezing. 1 Inhaler 1    OM 3-E-Linol-ALA-Oleic-GLA-Lip (OMEGA 3-6-9) 40-60-10 mg-mg-unit cap Take  by mouth daily.  rosuvastatin (CRESTOR) 40 mg tablet take 1 tablet by mouth at bedtime 90 Tab 3    lisinopril-hydroCHLOROthiazide (PRINZIDE, ZESTORETIC) 20-12.5 mg per tablet Take 2 Tabs by mouth daily. 180 Tab 3    plant stanol jerrell (CHOLEST OFF) 450 mg tab Take 1 Tab by mouth daily. 1 Tab 0    Cholecalciferol, Vitamin D3, (VITAMIN D3) 1,000 unit cap Take  by mouth.  multivitamin (ONE A DAY) tablet Take 1 Tab by mouth daily.  B.infantis-B.ani-B.long-B.bifi (PROBIOTIC 4X) 10-15 mg TbEC Take  by mouth daily.  Glucosamine &Chondroit-MV-Min3 501-129-06-0.5 mg tab Take 1 Tab by mouth daily. 1 Tab 0     No current facility-administered medications on file prior to visit. Allergies   Allergen Reactions    Strattera [Atomoxetine] Other (comments)     Prostatitus    Sulfa (Sulfonamide Antibiotics) Hives and Itching    Venom-Wasp Swelling       Objective:  Visit Vitals    /75    Pulse 61    Temp 96.8 °F (36 °C) (Oral)    Resp 16    Ht 5' 10\" (1.778 m)    Wt 250 lb 3.2 oz (113.5 kg)    BMI 35.9 kg/m2    Body mass index is 35.9 kg/(m^2). Constitutional: Well developed, nourished, no distress, alert   CV: S1, S2.  RRR. No murmurs/rubs. No thrills palpated. No carotid bruits.   Intact distal pulses. No edema. Pulm: No abnormalities on inspection. Clear to auscultation bilaterally. No wheezing/rhonchi. Normal effort. GI: Soft, nontender, nondistended. Normal active bowel sounds. No  masses on palpation. No hepatosplenomegaly.

## 2018-04-10 RX ORDER — AMLODIPINE BESYLATE 5 MG/1
TABLET ORAL
Qty: 90 TAB | Refills: 1 | Status: SHIPPED | OUTPATIENT
Start: 2018-04-10 | End: 2018-10-03 | Stop reason: SDUPTHER

## 2018-04-11 RX ORDER — VARENICLINE TARTRATE 0.5 MG/1
0.5 TABLET, FILM COATED ORAL 2 TIMES DAILY
Qty: 60 TAB | Refills: 1 | Status: SHIPPED | OUTPATIENT
Start: 2018-04-11 | End: 2018-05-11

## 2018-04-30 RX ORDER — LISINOPRIL AND HYDROCHLOROTHIAZIDE 12.5; 2 MG/1; MG/1
TABLET ORAL
Qty: 180 TAB | Refills: 3 | Status: SHIPPED | OUTPATIENT
Start: 2018-04-30 | End: 2019-04-09 | Stop reason: SDUPTHER

## 2018-04-30 RX ORDER — AMLODIPINE BESYLATE 10 MG/1
TABLET ORAL
Qty: 90 TAB | Refills: 3 | Status: SHIPPED | OUTPATIENT
Start: 2018-04-30 | End: 2019-04-09 | Stop reason: ALTCHOICE

## 2018-06-08 RX ORDER — ROSUVASTATIN CALCIUM 40 MG/1
TABLET, COATED ORAL
Qty: 90 TAB | Refills: 3 | Status: SHIPPED | OUTPATIENT
Start: 2018-06-08 | End: 2019-04-09 | Stop reason: SDUPTHER

## 2018-06-28 ENCOUNTER — OFFICE VISIT (OUTPATIENT)
Dept: FAMILY MEDICINE CLINIC | Age: 58
End: 2018-06-28

## 2018-06-28 VITALS
WEIGHT: 254 LBS | SYSTOLIC BLOOD PRESSURE: 135 MMHG | TEMPERATURE: 97.6 F | BODY MASS INDEX: 36.36 KG/M2 | HEIGHT: 70 IN | HEART RATE: 66 BPM | RESPIRATION RATE: 16 BRPM | DIASTOLIC BLOOD PRESSURE: 76 MMHG

## 2018-06-28 DIAGNOSIS — E78.2 MIXED HYPERLIPIDEMIA: ICD-10-CM

## 2018-06-28 DIAGNOSIS — I10 ESSENTIAL HYPERTENSION: Primary | ICD-10-CM

## 2018-06-28 DIAGNOSIS — E66.01 SEVERE OBESITY (BMI 35.0-39.9) WITH COMORBIDITY (HCC): ICD-10-CM

## 2018-06-28 DIAGNOSIS — R73.03 PREDIABETES: ICD-10-CM

## 2018-06-28 RX ORDER — VARENICLINE TARTRATE 0.5 MG/1
0.5 TABLET, FILM COATED ORAL 2 TIMES DAILY
Qty: 60 TAB | Refills: 5 | Status: SHIPPED | OUTPATIENT
Start: 2018-06-28 | End: 2019-04-09

## 2018-06-28 RX ORDER — VARENICLINE TARTRATE 0.5 MG/1
0.5 TABLET, FILM COATED ORAL 2 TIMES DAILY
COMMUNITY
End: 2018-06-28 | Stop reason: SDUPTHER

## 2018-06-28 NOTE — PROGRESS NOTES
1. Have you been to the ER, urgent care clinic since your last visit? Hospitalized since your last visit? No.     2. Have you seen or consulted any other health care providers outside of the [address] [place] [address] since your last visit? Include any pap smears or colon screening.  No.     Chief Complaint   Patient presents with    Follow Up Chronic Condition    Blood sugar problem    Hypertension    Obesity    Alcohol Problem

## 2018-06-28 NOTE — PATIENT INSTRUCTIONS
Look up the 5 and 2 diet- (2 days of very low calories-i.e. 500 calories) and then the other days have no restriction. DASH Diet: Care Instructions  Your Care Instructions    The DASH diet is an eating plan that can help lower your blood pressure. DASH stands for Dietary Approaches to Stop Hypertension. Hypertension is high blood pressure. The DASH diet focuses on eating foods that are high in calcium, potassium, and magnesium. These nutrients can lower blood pressure. The foods that are highest in these nutrients are fruits, vegetables, low-fat dairy products, nuts, seeds, and legumes. But taking calcium, potassium, and magnesium supplements instead of eating foods that are high in those nutrients does not have the same effect. The DASH diet also includes whole grains, fish, and poultry. The DASH diet is one of several lifestyle changes your doctor may recommend to lower your high blood pressure. Your doctor may also want you to decrease the amount of sodium in your diet. Lowering sodium while following the DASH diet can lower blood pressure even further than just the DASH diet alone. Follow-up care is a key part of your treatment and safety. Be sure to make and go to all appointments, and call your doctor if you are having problems. It's also a good idea to know your test results and keep a list of the medicines you take. How can you care for yourself at home? Following the DASH diet  · Eat 4 to 5 servings of fruit each day. A serving is 1 medium-sized piece of fruit, ½ cup chopped or canned fruit, 1/4 cup dried fruit, or 4 ounces (½ cup) of fruit juice. Choose fruit more often than fruit juice. · Eat 4 to 5 servings of vegetables each day. A serving is 1 cup of lettuce or raw leafy vegetables, ½ cup of chopped or cooked vegetables, or 4 ounces (½ cup) of vegetable juice. Choose vegetables more often than vegetable juice. · Get 2 to 3 servings of low-fat and fat-free dairy each day.  A serving is 8 ounces of milk, 1 cup of yogurt, or 1 ½ ounces of cheese. · Eat 6 to 8 servings of grains each day. A serving is 1 slice of bread, 1 ounce of dry cereal, or ½ cup of cooked rice, pasta, or cooked cereal. Try to choose whole-grain products as much as possible. · Limit lean meat, poultry, and fish to 2 servings each day. A serving is 3 ounces, about the size of a deck of cards. · Eat 4 to 5 servings of nuts, seeds, and legumes (cooked dried beans, lentils, and split peas) each week. A serving is 1/3 cup of nuts, 2 tablespoons of seeds, or ½ cup of cooked beans or peas. · Limit fats and oils to 2 to 3 servings each day. A serving is 1 teaspoon of vegetable oil or 2 tablespoons of salad dressing. · Limit sweets and added sugars to 5 servings or less a week. A serving is 1 tablespoon jelly or jam, ½ cup sorbet, or 1 cup of lemonade. · Eat less than 2,300 milligrams (mg) of sodium a day. If you limit your sodium to 1,500 mg a day, you can lower your blood pressure even more. Tips for success  · Start small. Do not try to make dramatic changes to your diet all at once. You might feel that you are missing out on your favorite foods and then be more likely to not follow the plan. Make small changes, and stick with them. Once those changes become habit, add a few more changes. · Try some of the following:  ¨ Make it a goal to eat a fruit or vegetable at every meal and at snacks. This will make it easy to get the recommended amount of fruits and vegetables each day. ¨ Try yogurt topped with fruit and nuts for a snack or healthy dessert. ¨ Add lettuce, tomato, cucumber, and onion to sandwiches. ¨ Combine a ready-made pizza crust with low-fat mozzarella cheese and lots of vegetable toppings. Try using tomatoes, squash, spinach, broccoli, carrots, cauliflower, and onions. ¨ Have a variety of cut-up vegetables with a low-fat dip as an appetizer instead of chips and dip.   ¨ Sprinkle sunflower seeds or chopped almonds over salads. Or try adding chopped walnuts or almonds to cooked vegetables. ¨ Try some vegetarian meals using beans and peas. Add garbanzo or kidney beans to salads. Make burritos and tacos with mashed hudson beans or black beans. Where can you learn more? Go to http://cristo-german.info/. Enter K104 in the search box to learn more about \"DASH Diet: Care Instructions. \"  Current as of: September 21, 2016  Content Version: 11.4  © 8155-2213 [employer]. Care instructions adapted under license by [employer] (which disclaims liability or warranty for this information). If you have questions about a medical condition or this instruction, always ask your healthcare professional. [address] any warranty or liability for your use of this information.

## 2018-06-28 NOTE — PROGRESS NOTES
Yue Randall is a 56 y.o. male and presents with Follow Up Chronic Condition; Blood sugar problem; Hypertension; Obesity; Alcohol Problem; Nicotine Dependence; and Medication Refill (Chantix 0.5mg , metoprolol and omeprazole)       Subjective: Tobacco abuse- still remains abstinent- doing well overall. Tapering chantix  Obesity- working on caloric restriction. No significant wt loss. Prediabetes- walking daily but increased fruit intake.   htn- taking all meds. No cp or sob. Avoids high sodium foods.       Assessment/Plan:    Tobacco abuse- continue Chantix. Encouraged slow taper of this. Obesity- severe with comorbidities- pt interested in metabolic weight loss program. Caloric restriction encouraged to 1 lb/week wt loss. Discussed \"5 and 2\" diet. Prediabetes- discussed increased hgb a1c and may  Need to add metformin if not improved in 3 months.   htn- as pt will enroll in wt loss program will hold on adding meds       RTC in 3 months      Orders Placed This Encounter    METABOLIC PANEL, COMPREHENSIVE     Standing Status:   Future     Standing Expiration Date:   6/29/2019    HEMOGLOBIN A1C WITH EAG     Standing Status:   Future     Standing Expiration Date:   6/29/2019    LIPID PANEL     Standing Status:   Future     Standing Expiration Date:   6/29/2019    DISCONTD: varenicline (CHANTIX) 0.5 mg tablet     Sig: Take 0.5 mg by mouth two (2) times a day.  varenicline (CHANTIX) 0.5 mg tablet     Sig: Take 1 Tab by mouth two (2) times a day. Dispense:  60 Tab     Refill:  5     Diagnoses and all orders for this visit:    1. Essential hypertension    2. Mixed hyperlipidemia  -     METABOLIC PANEL, COMPREHENSIVE; Future  -     LIPID PANEL; Future    3. Prediabetes  -     HEMOGLOBIN A1C WITH EAG; Future    4. Severe obesity (BMI 35.0-39. 9) with comorbidity ([address]    Other orders  -     varenicline (CHANTIX) 0.5 mg tablet; Take 1 Tab by mouth two (2) times a day.             ROS:  Negative except as mentioned above  Cardiac- no chest pain or palpitations  Pulmonary- no sob or wheezes  GI- no n/v or diarrhea. SH:  Social History   Substance Use Topics    Smoking status: Former Smoker     Packs/day: 1.00     Years: 30.00     Types: Cigarettes    Smokeless tobacco: Never Used    Alcohol use No      Comment: recoverin alcoholic         Medications/Allergies:  Current Outpatient Prescriptions on File Prior to Visit   Medication Sig Dispense Refill    rosuvastatin (CRESTOR) 40 mg tablet take 1 tablet by mouth at bedtime 90 Tab 3    lisinopril-hydroCHLOROthiazide (PRINZIDE, ZESTORETIC) 20-12.5 mg per tablet TAKE 2 TABLETS BY MOUTH DAILY 180 Tab 3    amLODIPine (NORVASC) 5 mg tablet take 1 tablet by mouth once daily 90 Tab 1    metoprolol succinate (TOPROL-XL) 100 mg tablet take 1 tablet by mouth once daily 90 Tab 3    omeprazole (PRILOSEC) 20 mg capsule take 1 capsule by mouth once daily 90 Cap 3    cyanocobalamin (VITAMIN B-12) 100 mcg tablet Take 100 mcg by mouth daily.  albuterol (PROVENTIL HFA, VENTOLIN HFA, PROAIR HFA) 90 mcg/actuation inhaler Take 1 Puff by inhalation every six (6) hours as needed for Wheezing. 1 Inhaler 1    OM 3-E-Linol-ALA-Oleic-GLA-Lip (OMEGA 3-6-9) 40-60-10 mg-mg-unit cap Take  by mouth daily.  B.infantis-B.ani-B.long-B.bifi (PROBIOTIC 4X) 10-15 mg TbEC Take  by mouth daily.  plant stanol jerrell (CHOLEST OFF) 450 mg tab Take 1 Tab by mouth daily. 1 Tab 0    Glucosamine &Chondroit-MV-Min3 962-871-31-0.5 mg tab Take 1 Tab by mouth daily. 1 Tab 0    Cholecalciferol, Vitamin D3, (VITAMIN D3) 1,000 unit cap Take  by mouth.  multivitamin (ONE A DAY) tablet Take 1 Tab by mouth daily.  amLODIPine (NORVASC) 10 mg tablet take 1 tablet by mouth once daily 90 Tab 3    varenicline (CHANTIX) 1 mg tablet TAKE 1 TABLET BY MOUTH TWICE A DAY 60 Tab 2     No current facility-administered medications on file prior to visit.            Allergies   Allergen Reactions    Strattera [Atomoxetine] Other (comments)     Prostatitus    Sulfa (Sulfonamide Antibiotics) Hives and Itching    Venom-Wasp Swelling       Objective:  Visit Vitals    /76    Pulse 66    Temp 97.6 °F (36.4 °C) (Oral)    Resp 16    Ht 5' 10\" (1.778 m)    Wt 254 lb (115.2 kg)    BMI 36.45 kg/m2    Body mass index is 36.45 kg/(m^2). Constitutional: Well developed, nourished, no distress, alert   CV: S1, S2.  RRR. No murmurs/rubs. No thrills palpated. No carotid bruits. Intact distal pulses. No edema. Pulm: No abnormalities on inspection. Clear to auscultation bilaterally. No wheezing/rhonchi. Normal effort. GI: Soft, nontender, nondistended. Normal active bowel sounds. No  masses on palpation. No hepatosplenomegaly.

## 2018-06-29 LAB
A-G RATIO,AGRAT: 2.4 RATIO (ref 1.1–2.6)
ALBUMIN SERPL-MCNC: 4.7 G/DL (ref 3.5–5)
ALP SERPL-CCNC: 71 U/L (ref 25–115)
ALT SERPL-CCNC: 20 U/L (ref 5–40)
ANION GAP SERPL CALC-SCNC: 15 MMOL/L
AST SERPL W P-5'-P-CCNC: 21 U/L (ref 10–37)
AVG GLU, 10930: 128 MG/DL (ref 91–123)
BILIRUB SERPL-MCNC: 0.3 MG/DL (ref 0.2–1.2)
BUN SERPL-MCNC: 14 MG/DL (ref 6–22)
CALCIUM SERPL-MCNC: 9.4 MG/DL (ref 8.4–10.4)
CHLORIDE SERPL-SCNC: 100 MMOL/L (ref 98–110)
CHOLEST SERPL-MCNC: 109 MG/DL (ref 110–200)
CO2 SERPL-SCNC: 28 MMOL/L (ref 20–32)
CREAT SERPL-MCNC: 0.8 MG/DL (ref 0.5–1.2)
GFRAA, 66117: >60
GFRNA, 66118: >60
GLOBULIN,GLOB: 2 G/DL (ref 2–4)
GLUCOSE SERPL-MCNC: 94 MG/DL (ref 70–99)
HBA1C MFR BLD HPLC: 6.1 % (ref 4.8–5.9)
HDLC SERPL-MCNC: 3 MG/DL (ref 0–5)
HDLC SERPL-MCNC: 36 MG/DL (ref 40–59)
LDLC SERPL CALC-MCNC: 50 MG/DL (ref 50–99)
POTASSIUM SERPL-SCNC: 4.2 MMOL/L (ref 3.5–5.5)
PROT SERPL-MCNC: 6.7 G/DL (ref 6.4–8.3)
SODIUM SERPL-SCNC: 143 MMOL/L (ref 133–145)
TRIGL SERPL-MCNC: 118 MG/DL (ref 40–149)
VLDLC SERPL CALC-MCNC: 24 MG/DL (ref 8–30)

## 2018-10-03 RX ORDER — AMLODIPINE BESYLATE 5 MG/1
TABLET ORAL
Qty: 90 TAB | Refills: 1 | Status: SHIPPED | OUTPATIENT
Start: 2018-10-03 | End: 2019-04-03 | Stop reason: SDUPTHER

## 2018-11-13 ENCOUNTER — DOCUMENTATION ONLY (OUTPATIENT)
Dept: FAMILY MEDICINE CLINIC | Age: 58
End: 2018-11-13

## 2018-11-13 ENCOUNTER — OFFICE VISIT (OUTPATIENT)
Dept: FAMILY MEDICINE CLINIC | Age: 58
End: 2018-11-13

## 2018-11-13 VITALS
SYSTOLIC BLOOD PRESSURE: 125 MMHG | RESPIRATION RATE: 16 BRPM | WEIGHT: 248 LBS | HEART RATE: 74 BPM | DIASTOLIC BLOOD PRESSURE: 89 MMHG | BODY MASS INDEX: 35.5 KG/M2 | HEIGHT: 70 IN | TEMPERATURE: 97.8 F

## 2018-11-13 DIAGNOSIS — E66.01 SEVERE OBESITY (BMI 35.0-39.9) WITH COMORBIDITY (HCC): ICD-10-CM

## 2018-11-13 DIAGNOSIS — E78.2 MIXED HYPERLIPIDEMIA: ICD-10-CM

## 2018-11-13 DIAGNOSIS — I10 ESSENTIAL HYPERTENSION: ICD-10-CM

## 2018-11-13 DIAGNOSIS — R73.03 PREDIABETES: ICD-10-CM

## 2018-11-13 DIAGNOSIS — I10 ESSENTIAL HYPERTENSION: Primary | ICD-10-CM

## 2018-11-13 DIAGNOSIS — F17.210 CIGARETTE NICOTINE DEPENDENCE WITHOUT COMPLICATION: ICD-10-CM

## 2018-11-13 DIAGNOSIS — Z12.5 PROSTATE CANCER SCREENING: ICD-10-CM

## 2018-11-13 NOTE — PATIENT INSTRUCTIONS
Stopping Smoking: Care Instructions Your Care Instructions Cigarette smokers crave the nicotine in cigarettes. Giving it up is much harder than simply changing a habit. Your body has to stop craving the nicotine. It is hard to quit, but you can do it. There are many tools that people use to quit smoking. You may find that combining tools works best for you. There are several steps to quitting. First you get ready to quit. Then you get support to help you. After that, you learn new skills and behaviors to become a nonsmoker. For many people, a necessary step is getting and using medicine. Your doctor will help you set up the plan that best meets your needs. You may want to attend a smoking cessation program to help you quit smoking. When you choose a program, look for one that has proven success. Ask your doctor for ideas. You will greatly increase your chances of success if you take medicine as well as get counseling or join a cessation program. 
Some of the changes you feel when you first quit tobacco are uncomfortable. Your body will miss the nicotine at first, and you may feel short-tempered and grumpy. You may have trouble sleeping or concentrating. Medicine can help you deal with these symptoms. You may struggle with changing your smoking habits and rituals. The last step is the tricky one: Be prepared for the smoking urge to continue for a time. This is a lot to deal with, but keep at it. You will feel better. Follow-up care is a key part of your treatment and safety. Be sure to make and go to all appointments, and call your doctor if you are having problems. It's also a good idea to know your test results and keep a list of the medicines you take. How can you care for yourself at home? · Ask your family, friends, and coworkers for support. You have a better chance of quitting if you have help and support.  
· Join a support group, such as Nicotine Anonymous, for people who are trying to quit smoking. · Consider signing up for a smoking cessation program, such as the American Lung Association's Freedom from Smoking program. 
· Get text messaging support. Go to the website at www.smokefree. gov to sign up for the [facility] program. 
· Set a quit date. Pick your date carefully so that it is not right in the middle of a big deadline or stressful time. Once you quit, do not even take a puff. Get rid of all ashtrays and lighters after your last cigarette. Clean your house and your clothes so that they do not smell of smoke. · Learn how to be a nonsmoker. Think about ways you can avoid those things that make you reach for a cigarette. ? Avoid situations that put you at greatest risk for smoking. For some people, it is hard to have a drink with friends without smoking. For others, they might skip a coffee break with coworkers who smoke. ? Change your daily routine. Take a different route to work or eat a meal in a different place. · Cut down on stress. Calm yourself or release tension by doing an activity you enjoy, such as reading a book, taking a hot bath, or gardening. · Talk to your doctor or pharmacist about nicotine replacement therapy, which replaces the nicotine in your body. You still get nicotine but you do not use tobacco. Nicotine replacement products help you slowly reduce the amount of nicotine you need. These products come in several forms, many of them available over-the-counter: ? Nicotine patches ? Nicotine gum and lozenges ? Nicotine inhaler · Ask your doctor about bupropion (Wellbutrin) or varenicline (Chantix), which are prescription medicines. They do not contain nicotine. They help you by reducing withdrawal symptoms, such as stress and anxiety. · Some people find hypnosis, acupuncture, and massage helpful for ending the smoking habit. · Eat a healthy diet and get regular exercise. Having healthy habits will help your body move past its craving for nicotine. · Be prepared to keep trying. Most people are not successful the first few times they try to quit. Do not get mad at yourself if you smoke again. Make a list of things you learned and think about when you want to try again, such as next week, next month, or next year. Where can you learn more? Go to http://cristo-german.info/. Enter L147 in the search box to learn more about \"Stopping Smoking: Care Instructions. \" Current as of: November 29, 2017 Content Version: 11.8 © 6706-0451 [employer]wise, [employer]. Care instructions adapted under license by  (which disclaims liability or warranty for this information). If you have questions about a medical condition or this instruction, always ask your healthcare professional. [address] any warranty or liability for your use of this information.

## 2018-11-13 NOTE — PROGRESS NOTES
Foster Powell is a 66 y.o. male and presents with Follow Up Chronic Condition Subjective: HTN- taking all meds . No cp or sob. Exercising at gym now with wife. Hyperlipidemia- taking crestor high dose. No myalgia. Prediabetes-no polyuria or polydipsia. Watching caloric intake. Obesity-gained almost 4 lbs. Tobacco- resumed smoking. Stress-  his father and uncle both . Going to [place] soon for [employer]. Assessment/Plan:   
 
Diagnoses and all orders for this visit: 1. Essential hypertension- bp controlled- no changes 2. Mixed hyperlipidemia- continue tx. No chages 3. Prediabetes- continue to follow and wt loss encouraged. 4. Severe obesity (BMI 35.0-39. 9) with comorbidity ([address]- continue to reduce calories and continue exercise encouraged. 5. Cigarette nicotine dependence without complication- encouraged cessation RTC in 6 month with labs. Orders Placed This Encounter  HEMOGLOBIN A1C WITH EAG Standing Status:   Future Standing Expiration Date:   2019  METABOLIC PANEL, COMPREHENSIVE Standing Status:   Future Standing Expiration Date:   2019  LIPID PANEL Standing Status:   Future Standing Expiration Date:   2019  PSA SCREENING (SCREENING) Standing Status:   Future Standing Expiration Date:   2019  URINALYSIS W/ RFLX MICROSCOPIC Standing Status:   Future Standing Expiration Date:   2019 Diagnoses and all orders for this visit: 1. Essential hypertension 
-     URINALYSIS W/ RFLX MICROSCOPIC; Future 2. Mixed hyperlipidemia -     METABOLIC PANEL, COMPREHENSIVE; Future -     LIPID PANEL; Future -     URINALYSIS W/ RFLX MICROSCOPIC; Future 3. Prediabetes 
-     HEMOGLOBIN A1C WITH EAG; Future 4. Severe obesity (BMI 35.0-39. 9) with comorbidity (HCC) 
-     URINALYSIS W/ RFLX MICROSCOPIC; Future 5. Cigarette nicotine dependence without complication 6. Prostate cancer screening -     PSA SCREENING (SCREENING); Future ROS: 
Negative except as mentioned above Cardiac- no chest pain or palpitations Pulmonary- no sob or wheezes GI- no n/v or diarrhea. SH: Social History Tobacco Use  Smoking status: Current Every Day Smoker Packs/day: 1.00 Years: 30.00 Pack years: 30.00 Types: Cigarettes  Smokeless tobacco: Never Used Substance Use Topics  Alcohol use: No  
  Comment: recoverin alcoholic  Drug use: No  
 
 
 
Medications/Allergies: 
Current Outpatient Medications on File Prior to Visit Medication Sig Dispense Refill  amLODIPine (NORVASC) 5 mg tablet take 1 tablet by mouth once daily 90 Tab 1  varenicline (CHANTIX) 0.5 mg tablet Take 1 Tab by mouth two (2) times a day. 60 Tab 5  
 rosuvastatin (CRESTOR) 40 mg tablet take 1 tablet by mouth at bedtime 90 Tab 3  
 lisinopril-hydroCHLOROthiazide (PRINZIDE, ZESTORETIC) 20-12.5 mg per tablet TAKE 2 TABLETS BY MOUTH DAILY 180 Tab 3  
 metoprolol succinate (TOPROL-XL) 100 mg tablet take 1 tablet by mouth once daily 90 Tab 3  
 omeprazole (PRILOSEC) 20 mg capsule take 1 capsule by mouth once daily 90 Cap 3  
 OM 3-E-Linol-ALA-Oleic-GLA-Lip (OMEGA 3-6-9) 40-60-10 mg-mg-unit cap Take  by mouth daily.  plant stanol jerrell (CHOLEST OFF) 450 mg tab Take 1 Tab by mouth daily. 1 Tab 0  
 Glucosamine &Chondroit-MV-Min3 343-808-83-0.5 mg tab Take 1 Tab by mouth daily. 1 Tab 0  Cholecalciferol, Vitamin D3, (VITAMIN D3) 1,000 unit cap Take  by mouth.  multivitamin (ONE A DAY) tablet Take 1 Tab by mouth daily.  amLODIPine (NORVASC) 10 mg tablet take 1 tablet by mouth once daily 90 Tab 3  varenicline (CHANTIX) 1 mg tablet TAKE 1 TABLET BY MOUTH TWICE A DAY 60 Tab 2  cyanocobalamin (VITAMIN B-12) 100 mcg tablet Take 100 mcg by mouth daily.     
 albuterol (PROVENTIL HFA, VENTOLIN HFA, PROAIR HFA) 90 mcg/actuation inhaler Take 1 Puff by inhalation every six (6) hours as needed for Wheezing. 1 Inhaler 1  
 B.infantis-B.ani-B.long-B.bifi (PROBIOTIC 4X) 10-15 mg TbEC Take  by mouth daily. No current facility-administered medications on file prior to visit. Allergies Allergen Reactions  Strattera [Atomoxetine] Other (comments) Prostatitus  Sulfa (Sulfonamide Antibiotics) Hives and Itching  Venom-Wasp Swelling Objective: 
Visit Vitals /89 Pulse 74 Temp 97.8 °F (36.6 °C) (Oral) Resp 16 Ht 5' 10\" (1.778 m) Wt 248 lb (112.5 kg) BMI 35.58 kg/m² Body mass index is 35.58 kg/m². Constitutional: Well developed, nourished, no distress, alert HENT: Exterior ears and tympanic membranes normal bilaterally. Supple neck. No thyromegaly or lymphadenopathy. Oropharynx clear and moist mucous membranes. Eyes: Conjunctiva normal. PERRL. CV: S1, S2.  RRR. No murmurs/rubs. No thrills palpated. No carotid bruits. Intact distal pulses. No edema. Pulm: No abnormalities on inspection. Clear to auscultation bilaterally. No wheezing/rhonchi. Normal effort. GI: Soft, nontender, nondistended. Normal active bowel sounds. No  masses on palpation. No hepatosplenomegaly.

## 2018-11-13 NOTE — PROGRESS NOTES
1. Have you been to the ER, urgent care clinic since your last visit? Hospitalized since your last visit? No 
 
2. Have you seen or consulted any other health care providers outside of the [address] [place] [address] since your last visit? Include any pap smears or colon screening.  No

## 2019-03-19 LAB
A-G RATIO,AGRAT: 2.4 RATIO (ref 1.1–2.6)
ALBUMIN SERPL-MCNC: 4.7 G/DL (ref 3.5–5)
ALP SERPL-CCNC: 74 U/L (ref 25–115)
ALT SERPL-CCNC: 19 U/L (ref 5–40)
ANION GAP SERPL CALC-SCNC: 19 MMOL/L
AST SERPL W P-5'-P-CCNC: 22 U/L (ref 10–37)
AVG GLU, 10930: 114 MG/DL (ref 91–123)
BILIRUB SERPL-MCNC: 0.2 MG/DL (ref 0.2–1.2)
BILIRUB UR QL: NEGATIVE
BUN SERPL-MCNC: 19 MG/DL (ref 6–22)
CALCIUM SERPL-MCNC: 9.6 MG/DL (ref 8.4–10.4)
CHLORIDE SERPL-SCNC: 104 MMOL/L (ref 98–110)
CHOLEST SERPL-MCNC: 119 MG/DL (ref 110–200)
CO2 SERPL-SCNC: 25 MMOL/L (ref 20–32)
CREAT SERPL-MCNC: 0.7 MG/DL (ref 0.5–1.2)
GFRAA, 66117: >60
GFRNA, 66118: >60
GLOBULIN,GLOB: 2 G/DL (ref 2–4)
GLUCOSE SERPL-MCNC: 94 MG/DL (ref 70–99)
GLUCOSE UR QL: NEGATIVE MG/DL
HBA1C MFR BLD HPLC: 5.6 % (ref 4.8–5.9)
HDLC SERPL-MCNC: 3.7 MG/DL (ref 0–5)
HDLC SERPL-MCNC: 32 MG/DL (ref 40–59)
HGB UR QL STRIP: NEGATIVE
KETONES UR QL STRIP.AUTO: NEGATIVE MG/DL
LDLC SERPL CALC-MCNC: 68 MG/DL (ref 50–99)
LEUKOCYTE ESTERASE: NEGATIVE
NITRITE UR QL STRIP.AUTO: NEGATIVE
PH UR STRIP: 6 PH (ref 5–8)
POTASSIUM SERPL-SCNC: 4.3 MMOL/L (ref 3.5–5.5)
PROT SERPL-MCNC: 6.7 G/DL (ref 6.4–8.3)
PROT UR QL STRIP: NEGATIVE MG/DL
PSA SERPL-MCNC: 0.84 NG/ML
RBC #/AREA URNS HPF: NORMAL /HPF
SODIUM SERPL-SCNC: 148 MMOL/L (ref 133–145)
SP GR UR: 1.02 (ref 1–1.03)
TRIGL SERPL-MCNC: 95 MG/DL (ref 40–149)
UROBILINOGEN UR STRIP-MCNC: <2 MG/DL
VLDLC SERPL CALC-MCNC: 19 MG/DL (ref 8–30)
WBC URNS QL MICRO: NORMAL /HPF (ref 0–2)

## 2019-04-09 ENCOUNTER — OFFICE VISIT (OUTPATIENT)
Dept: FAMILY MEDICINE CLINIC | Age: 59
End: 2019-04-09

## 2019-04-09 VITALS
SYSTOLIC BLOOD PRESSURE: 150 MMHG | TEMPERATURE: 96.7 F | HEIGHT: 70 IN | RESPIRATION RATE: 14 BRPM | BODY MASS INDEX: 32.5 KG/M2 | DIASTOLIC BLOOD PRESSURE: 87 MMHG | HEART RATE: 74 BPM | WEIGHT: 227 LBS

## 2019-04-09 DIAGNOSIS — E78.2 MIXED HYPERLIPIDEMIA: ICD-10-CM

## 2019-04-09 DIAGNOSIS — I10 ESSENTIAL HYPERTENSION: ICD-10-CM

## 2019-04-09 DIAGNOSIS — E66.9 OBESITY (BMI 30-39.9): ICD-10-CM

## 2019-04-09 DIAGNOSIS — D12.6 ADENOMATOUS POLYP OF COLON, UNSPECIFIED PART OF COLON: Primary | ICD-10-CM

## 2019-04-09 DIAGNOSIS — R73.03 PREDIABETES: ICD-10-CM

## 2019-04-09 DIAGNOSIS — F10.21 ALCOHOLISM IN REMISSION (HCC): ICD-10-CM

## 2019-04-09 PROBLEM — E66.01 SEVERE OBESITY (BMI 35.0-39.9) WITH COMORBIDITY (HCC): Status: RESOLVED | Noted: 2018-03-26 | Resolved: 2019-04-09

## 2019-04-09 RX ORDER — ROSUVASTATIN CALCIUM 20 MG/1
TABLET, COATED ORAL
Qty: 90 TAB | Refills: 3 | Status: SHIPPED | OUTPATIENT
Start: 2019-04-09 | End: 2020-04-15

## 2019-04-09 RX ORDER — LISINOPRIL AND HYDROCHLOROTHIAZIDE 12.5; 2 MG/1; MG/1
TABLET ORAL
Qty: 180 TAB | Refills: 3 | Status: SHIPPED | OUTPATIENT
Start: 2019-04-09 | End: 2020-04-03

## 2019-04-09 NOTE — PATIENT INSTRUCTIONS
Colon Polyps: Care Instructions Your Care Instructions Colon polyps are growths in the colon or the rectum. The cause of most colon polyps is not known, and most people who get them do not have any problems. But a certain kind can turn into cancer. For this reason, regular testing for colon polyps is important for people age 48 and older and anyone who has an increased risk for colon cancer. Polyps are usually found through routine colon cancer screening tests. Although most colon polyps are not cancerous, they are usually removed and then tested for cancer. Screening for colon cancer saves lives because the cancer can usually be cured if it is caught early. If you have a polyp that is the type that can turn into cancer, you may need more tests to examine your entire colon. The doctor will remove any other polyps that he or she finds, and you will be tested more often. Follow-up care is a key part of your treatment and safety. Be sure to make and go to all appointments, and call your doctor if you are having problems. It's also a good idea to know your test results and keep a list of the medicines you take. How can you care for yourself at home? Regular exams to look for colon polyps are the best way to prevent polyps from turning into colon cancer. These can include stool tests, sigmoidoscopy, colonoscopy, and CT colonography. Talk with your doctor about a testing schedule that is right for you. To prevent polyps There is no home treatment that can prevent colon polyps. But these steps may help lower your risk for cancer. · Stay active. Being active can help you get to and stay at a healthy weight. Try to exercise on most days of the week. Walking is a good choice. · Eat well. Choose a variety of vegetables, fruits, legumes (such as peas and beans), fish, poultry, and whole grains. · Do not smoke.  If you need help quitting, talk to your doctor about stop-smoking programs and medicines. These can increase your chances of quitting for good. · If you drink alcohol, limit how much you drink. Limit alcohol to 2 drinks a day for men and 1 drink a day for women. When should you call for help? Call your doctor now or seek immediate medical care if: 
  · You have severe belly pain.  
  · Your stools are maroon or very bloody.  
 Watch closely for changes in your health, and be sure to contact your doctor if: 
  · You have a fever.  
  · You have nausea or vomiting.  
  · You have a change in bowel habits (new constipation or diarrhea).  
  · Your symptoms get worse or are not improving as expected. Where can you learn more? Go to http://cristo-german.info/. Enter 95 [id number] in the search box to learn more about \"Colon Polyps: Care Instructions. \" Current as of: March 27, 2018 Content Version: 11.9 © 2767-1747 [employer], [employer]. Care instructions adapted under license by [facility] (which disclaims liability or warranty for this information). If you have questions about a medical condition or this instruction, always ask your healthcare professional. [place] [address] any warranty or liability for your use of this information.

## 2019-04-09 NOTE — PROGRESS NOTES
Geetha Villafana is a 66 y.o. male and presents with Follow Up Chronic Condition and Medication Refill (pended) Subjective: Hyperlipidemia- on statin- no myalgia or abd pain. Doing well with avoiding fat intake.  
htn- mildly elevated today. Obesity- doing very well. Eating more fish. H/o adenomatous polyp- due for f/u- need new referral.  
 
Assessment/Plan: Hyperlipidemia- excellent results- will decrease dose of crestor to 20mg given no h/o CVA or MI.  
htn- monitor for now. Obesity- continue wt loss encouraged. H/o adenomatous polyp- referral placed. Discussed with pt . Mild isolated hypernatremia- recheck next visit. Diagnoses and all orders for this visit: 1. Adenomatous polyp of colon, unspecified part of colon 
-     REFERRAL TO GASTROENTEROLOGY 2. Mixed hyperlipidemia -     LIPID PANEL; Future -     METABOLIC PANEL, BASIC; Future 3. Essential hypertension -     METABOLIC PANEL, BASIC; Future 4. Prediabetes 5. Obesity (BMI 30-39.9) 6. Alcoholism in remission ([facility] [address] Other orders 
-     lisinopril-hydroCHLOROthiazide (PRINZIDE, ZESTORETIC) 20-12.5 mg per tablet; TAKE 2 TABLETS BY MOUTH DAILY 
-     rosuvastatin (CRESTOR) 20 mg tablet; take 1 tablet by mouth at bedtime 
-     varicella-zoster recombinant, PF, (SHINGRIX, PF,) 50 mcg/0.5 mL susr injection; 0.5 mL by IntraMUSCular route once for 1 dose. 
-     varicella-zoster recombinant, PF, (SHINGRIX, PF,) 50 mcg/0.5 mL susr injection; 0.5 mL by IntraMUSCular route once for 1 dose. To be done 2-6 months after initial vaccination. RTC in 4 months. Orders Placed This Encounter  REFERRAL TO GASTROENTEROLOGY Referral Priority:   Routine Referral Type:   Consultation Referral Reason:   Specialty Services Required Referred to Provider:   Redd Donaldson MD  
  Number of Visits Requested:   1  
 lisinopril-hydroCHLOROthiazide (PRINZIDE, ZESTORETIC) 20-12.5 mg per tablet Sig: TAKE 2 TABLETS BY MOUTH DAILY Dispense:  180 Tab Refill:  3  
 rosuvastatin (CRESTOR) 20 mg tablet Sig: take 1 tablet by mouth at bedtime Dispense:  90 Tab Refill:  3  
 varicella-zoster recombinant, PF, (SHINGRIX, PF,) 50 mcg/0.5 mL susr injection Si.5 mL by IntraMUSCular route once for 1 dose. Dispense:  0.5 mL Refill:  0  
 varicella-zoster recombinant, PF, (SHINGRIX, PF,) 50 mcg/0.5 mL susr injection Si.5 mL by IntraMUSCular route once for 1 dose. To be done 2-6 months after initial vaccination. Dispense:  0.5 mL Refill:  0  
 
 
 
ROS: 
Negative except as mentioned above Cardiac- no chest pain or palpitations Pulmonary- no sob or wheezes GI- no n/v or diarrhea. SH: Social History Tobacco Use  Smoking status: Current Every Day Smoker Packs/day: 1.00 Years: 30.00 Pack years: 30.00 Types: Cigarettes  Smokeless tobacco: Never Used Substance Use Topics  Alcohol use: No  
  Comment: recoverin alcoholic  Drug use: No  
 
 
 
Medications/Allergies: 
Current Outpatient Medications on File Prior to Visit Medication Sig Dispense Refill  amLODIPine (NORVASC) 5 mg tablet take 1 tablet by mouth once daily 90 Tab 3  
 metoprolol succinate (TOPROL-XL) 100 mg tablet take 1 tablet by mouth once daily 90 Tab 3  
 omeprazole (PRILOSEC) 20 mg capsule take 1 capsule by mouth once daily 90 Cap 3  
 rosuvastatin (CRESTOR) 40 mg tablet take 1 tablet by mouth at bedtime 90 Tab 3  
 lisinopril-hydroCHLOROthiazide (PRINZIDE, ZESTORETIC) 20-12.5 mg per tablet TAKE 2 TABLETS BY MOUTH DAILY 180 Tab 3  cyanocobalamin (VITAMIN B-12) 100 mcg tablet Take 100 mcg by mouth daily.  albuterol (PROVENTIL HFA, VENTOLIN HFA, PROAIR HFA) 90 mcg/actuation inhaler Take 1 Puff by inhalation every six (6) hours as needed for Wheezing.  1 Inhaler 1  
 OM 3-E-Linol-ALA-Oleic-GLA-Lip (OMEGA 3-6-9) 40-60-10 mg-mg-unit cap Take  by mouth daily.  B.infantis-B.ani-B.long-B.bifi (PROBIOTIC 4X) 10-15 mg TbEC Take  by mouth daily.  plant stanol jerrell (CHOLEST OFF) 450 mg tab Take 1 Tab by mouth daily. 1 Tab 0  
 Glucosamine &Chondroit-MV-Min3 715-124-99-0.5 mg tab Take 1 Tab by mouth daily. 1 Tab 0  Cholecalciferol, Vitamin D3, (VITAMIN D3) 1,000 unit cap Take  by mouth.  multivitamin (ONE A DAY) tablet Take 1 Tab by mouth daily.  metoprolol succinate (TOPROL-XL) 100 mg tablet take 1 tablet by mouth once daily 90 Tab 3  varenicline (CHANTIX) 0.5 mg tablet Take 1 Tab by mouth two (2) times a day. 60 Tab 5  
 amLODIPine (NORVASC) 10 mg tablet take 1 tablet by mouth once daily 90 Tab 3  varenicline (CHANTIX) 1 mg tablet TAKE 1 TABLET BY MOUTH TWICE A DAY 60 Tab 2 No current facility-administered medications on file prior to visit. Allergies Allergen Reactions  Strattera [Atomoxetine] Other (comments) Prostatitus  Sulfa (Sulfonamide Antibiotics) Hives and Itching  Venom-Wasp Swelling Objective: 
Visit Vitals /87 Pulse 74 Temp 96.7 °F (35.9 °C) (Oral) Resp 14 Ht 5' 10\" (1.778 m) Wt 227 lb (103 kg) BMI 32.57 kg/m² Body mass index is 32.57 kg/m². Constitutional: Well developed, nourished, no distress, alert CV: S1, S2.  RRR. No murmurs/rubs. No thrills palpated. No carotid bruits. Intact distal pulses. No edema. Pulm: No abnormalities on inspection. Clear to auscultation bilaterally. No wheezing/rhonchi. Normal effort. GI: Soft, nontender, nondistended. Normal active bowel sounds. No  masses on palpation. No hepatosplenomegaly.

## 2019-06-20 RX ORDER — ROSUVASTATIN CALCIUM 40 MG/1
TABLET, COATED ORAL
Qty: 90 TAB | Refills: 3 | Status: SHIPPED | OUTPATIENT
Start: 2019-06-20

## 2019-07-30 LAB
ANION GAP SERPL CALC-SCNC: 14 MMOL/L
BUN SERPL-MCNC: 12 MG/DL (ref 6–22)
CALCIUM SERPL-MCNC: 9.7 MG/DL (ref 8.4–10.4)
CHLORIDE SERPL-SCNC: 100 MMOL/L (ref 98–110)
CHOLEST SERPL-MCNC: 109 MG/DL (ref 110–200)
CO2 SERPL-SCNC: 29 MMOL/L (ref 20–32)
CREAT SERPL-MCNC: 0.6 MG/DL (ref 0.5–1.2)
GFRAA, 66117: >60
GFRNA, 66118: >60
GLUCOSE SERPL-MCNC: 99 MG/DL (ref 70–99)
HDLC SERPL-MCNC: 3.2 MG/DL (ref 0–5)
HDLC SERPL-MCNC: 34 MG/DL (ref 40–59)
LDLC SERPL CALC-MCNC: 58 MG/DL (ref 50–99)
POTASSIUM SERPL-SCNC: 3.8 MMOL/L (ref 3.5–5.5)
SODIUM SERPL-SCNC: 143 MMOL/L (ref 133–145)
TRIGL SERPL-MCNC: 87 MG/DL (ref 40–149)
VLDLC SERPL CALC-MCNC: 17 MG/DL (ref 8–30)

## 2020-01-02 RX ORDER — OMEPRAZOLE 20 MG/1
CAPSULE, DELAYED RELEASE ORAL
Qty: 90 CAP | Refills: 3 | Status: SHIPPED | OUTPATIENT
Start: 2020-01-02

## 2020-02-14 RX ORDER — METOPROLOL SUCCINATE 100 MG/1
TABLET, EXTENDED RELEASE ORAL
Qty: 90 TAB | Refills: 3 | Status: SHIPPED | OUTPATIENT
Start: 2020-02-14

## 2020-04-03 RX ORDER — LISINOPRIL AND HYDROCHLOROTHIAZIDE 12.5; 2 MG/1; MG/1
TABLET ORAL
Qty: 180 TAB | Refills: 3 | Status: SHIPPED | OUTPATIENT
Start: 2020-04-03

## 2020-04-03 RX ORDER — AMLODIPINE BESYLATE 5 MG/1
TABLET ORAL
Qty: 90 TAB | Refills: 3 | Status: SHIPPED | OUTPATIENT
Start: 2020-04-03

## 2020-04-15 ENCOUNTER — DOCUMENTATION ONLY (OUTPATIENT)
Dept: FAMILY MEDICINE CLINIC | Age: 60
End: 2020-04-15

## 2020-08-20 RX ORDER — ALBUTEROL SULFATE 90 UG/1
1 AEROSOL, METERED RESPIRATORY (INHALATION)
Qty: 1 INHALER | Refills: 1 | Status: SHIPPED | OUTPATIENT
Start: 2020-08-20

## 2020-10-01 RX ORDER — ROSUVASTATIN CALCIUM 20 MG/1
TABLET, COATED ORAL
Qty: 90 TAB | Refills: 0 | Status: SHIPPED | OUTPATIENT
Start: 2020-10-01

## 2023-10-10 NOTE — PROGRESS NOTES
1. Have you been to the ER, urgent care clinic since your last visit? Hospitalized since your last visit? No 
 
2. Have you seen or consulted any other health care providers outside of the [address] [place] [address] since your last visit? Include any pap smears or colon screening.  No 
 
 Admission